# Patient Record
Sex: MALE | Race: ASIAN | NOT HISPANIC OR LATINO | URBAN - METROPOLITAN AREA
[De-identification: names, ages, dates, MRNs, and addresses within clinical notes are randomized per-mention and may not be internally consistent; named-entity substitution may affect disease eponyms.]

---

## 2019-10-04 NOTE — TELEPHONE ENCOUNTER
RECORDS RECEIVED FROM: Rt Hip Pain, No Imaging, No Surgeries, Appt Per Pts Wife   DATE RECEIVED: 10/4   NOTES STATUS DETAILS   OFFICE NOTE from referring provider N/A    OFFICE NOTE from other specialist N/A    DISCHARGE SUMMARY from hospital N/A    DISCHARGE REPORT from the ER N/A    OPERATIVE REPORT N/A    MEDICATION LIST N/A    MRI N/A    CT SCAN N/A    XRAYS (IMAGES & REPORTS) N/A

## 2019-10-10 DIAGNOSIS — M25.551 RIGHT HIP PAIN: Primary | ICD-10-CM

## 2019-10-11 ENCOUNTER — PRE VISIT (OUTPATIENT)
Dept: ORTHOPEDICS | Facility: CLINIC | Age: 79
End: 2019-10-11

## 2019-10-11 ENCOUNTER — ANCILLARY PROCEDURE (OUTPATIENT)
Dept: GENERAL RADIOLOGY | Facility: CLINIC | Age: 79
End: 2019-10-11
Payer: COMMERCIAL

## 2019-10-11 ENCOUNTER — OFFICE VISIT (OUTPATIENT)
Dept: ORTHOPEDICS | Facility: CLINIC | Age: 79
End: 2019-10-11
Payer: COMMERCIAL

## 2019-10-11 VITALS
HEIGHT: 62 IN | DIASTOLIC BLOOD PRESSURE: 78 MMHG | WEIGHT: 146.2 LBS | BODY MASS INDEX: 26.91 KG/M2 | SYSTOLIC BLOOD PRESSURE: 138 MMHG | HEART RATE: 62 BPM

## 2019-10-11 DIAGNOSIS — M25.551 RIGHT HIP PAIN: ICD-10-CM

## 2019-10-11 DIAGNOSIS — M25.551 LATERAL PAIN OF RIGHT HIP: Primary | ICD-10-CM

## 2019-10-11 RX ORDER — AMLODIPINE BESYLATE 10 MG/1
TABLET ORAL
Refills: 2 | COMMUNITY
Start: 2019-08-20

## 2019-10-11 RX ORDER — ATORVASTATIN CALCIUM 10 MG/1
TABLET, FILM COATED ORAL
Refills: 1 | COMMUNITY
Start: 2019-10-03

## 2019-10-11 RX ORDER — HYDROCHLOROTHIAZIDE 12.5 MG/1
TABLET ORAL
Refills: 2 | COMMUNITY
Start: 2019-08-12

## 2019-10-11 RX ORDER — CHOLECALCIFEROL (VITAMIN D3) 50 MCG
1 TABLET ORAL
COMMUNITY
Start: 2013-01-04

## 2019-10-11 RX ORDER — METOPROLOL SUCCINATE 50 MG/1
TABLET, EXTENDED RELEASE ORAL
Refills: 2 | COMMUNITY
Start: 2019-08-20

## 2019-10-11 RX ORDER — LANOLIN ALCOHOL/MO/W.PET/CERES
1000 CREAM (GRAM) TOPICAL
COMMUNITY

## 2019-10-11 ASSESSMENT — MIFFLIN-ST. JEOR: SCORE: 1254.41

## 2019-10-11 NOTE — PROGRESS NOTES
" Subjective:   Huber Villalba is a 79 year old male who presents with right hip/buttock pain.  He states that for the past year he has had some intermittent right lateral and posterior hip discomfort.  He states this oftentimes can occur late in exercise or after laying on his right side.  He has not had any trauma to the right hip.  He does not have any pain with exercise on the elliptical  or while walking on the treadmill.    Background:   Date of injury: None  Duration of symptoms: 1 year  Mechanism of Injury: Chronic; Unknown   Intensity: No pain at rest; 3-4/10 when walking; lying on right side 1-2/10  Aggravating factors:Walking for about 100-200 yards, lying on right side  Relieving Factors: Nothing has been tried  Prior Evaluation: None    PAST MEDICAL, SOCIAL, SURGICAL AND FAMILY HISTORY: He  has no past medical history on file.  He  has no past surgical history on file.  His family history is not on file.  He reports that he has never smoked. He has never used smokeless tobacco.    ALLERGIES: He is allergic to nsaids.    CURRENT MEDICATIONS: He has a current medication list which includes the following prescription(s): amlodipine, atorvastatin, cyanocobalamin, hydrochlorothiazide, metoprolol succinate er, turmeric curcumin, and vitamin d3.     REVIEW OF SYSTEMS: 12 point review of systems is negative except as noted above.     Exam:   /72 (BP Location: Left arm, Patient Position: Sitting, Cuff Size: Adult Regular)   Pulse 73   Ht 1.57 m (5' 1.81\")   Wt 66.3 kg (146 lb 3.2 oz)   BMI 26.90 kg/m        CONSTITUTIONAL: healthy, alert and no distress  HEAD: Normocephalic. No masses, lesions, tenderness or abnormalities  SKIN: no suspicious lesions or rashes  GAIT: normal  PSYCHIATRIC: affect normal/bright and mentation appears normal.    MUSCULOSKELETAL:   RIGHT HIP: comprehensive examination demonstrates FROM, (-) FAdIR, (-) Eloise, (-) log roll. MMT of the hip demonstrates ability to actively " flex, abduct, adduct and extend the hip.  He has minimal discomfort with resisted hip abduction.  He is mildly tender to palpation in the region of the gluteus medius tendon just posterior and superior to its insertion at the greater trochanter.       Assessment/Plan:   Huber is a 79-year-old male with right lateral hip discomfort which is intermittent.  His radiographs demonstrate some mild degenerative changes of the hip.  His exam does not suggest that this is intra-articular.  I suspect there is some mild degenerative changes of his gluteus medius tendon versus a somewhat aberrant bursa.  I discussed consideration for physical therapy for instruction in a progressive gluteus medius strengthening program but he will obtain this from 1 of his trainers at the gym.  He is otherwise reassured.

## 2019-10-11 NOTE — LETTER
"  10/11/2019      RE: Huber Villalba  [address] [address]        Subjective:   Huber Villalba is a 79 year old male who presents with right hip/buttock pain.  He states that for the past year he has had some intermittent right lateral and posterior hip discomfort.  He states this oftentimes can occur late in exercise or after laying on his right side.  He has not had any trauma to the right hip.  He does not have any pain with exercise on the elliptical  or while walking on the treadmill.    Background:   Date of injury: None  Duration of symptoms: 1 year  Mechanism of Injury: Chronic; Unknown   Intensity: No pain at rest; 3-4/10 when walking; lying on right side 1-2/10  Aggravating factors:Walking for about 100-200 yards, lying on right side  Relieving Factors: Nothing has been tried  Prior Evaluation: None    PAST MEDICAL, SOCIAL, SURGICAL AND FAMILY HISTORY: He  has no past medical history on file.  He  has no past surgical history on file.  His family history is not on file.  He reports that he has never smoked. He has never used smokeless tobacco.    ALLERGIES: He is allergic to nsaids.    CURRENT MEDICATIONS: He has a current medication list which includes the following prescription(s): amlodipine, atorvastatin, cyanocobalamin, hydrochlorothiazide, metoprolol succinate er, turmeric curcumin, and vitamin d3.     REVIEW OF SYSTEMS: 12 point review of systems is negative except as noted above.     Exam:   /72 (BP Location: Left arm, Patient Position: Sitting, Cuff Size: Adult Regular)   Pulse 73   Ht 1.57 m (5' 1.81\")   Wt 66.3 kg (146 lb 3.2 oz)   BMI 26.90 kg/m         CONSTITUTIONAL: healthy, alert and no distress  HEAD: Normocephalic. No masses, lesions, tenderness or abnormalities  SKIN: no suspicious lesions or rashes  GAIT: normal  PSYCHIATRIC: affect normal/bright and mentation appears normal.    MUSCULOSKELETAL:   RIGHT HIP: comprehensive examination demonstrates " FROM, (-) FAdIR, (-) Eloise, (-) log roll. MMT of the hip demonstrates ability to actively flex, abduct, adduct and extend the hip.  He has minimal discomfort with resisted hip abduction.  He is mildly tender to palpation in the region of the gluteus medius tendon just posterior and superior to its insertion at the greater trochanter.       Assessment/Plan:   Huber is a 79-year-old male with right lateral hip discomfort which is intermittent.  His radiographs demonstrate some mild degenerative changes of the hip.  His exam does not suggest that this is intra-articular.  I suspect there is some mild degenerative changes of his gluteus medius tendon versus a somewhat aberrant bursa.  I discussed consideration for physical therapy for instruction in a progressive gluteus medius strengthening program but he will obtain this from 1 of his trainers at the gym.  He is otherwise reassured.    Osmin Webster MD

## 2019-10-11 NOTE — LETTER
Date:October 14, 2019      Patient was self referred, no letter generated. Do not send.        [facility] Health Information

## 2019-10-30 ENCOUNTER — HEALTH MAINTENANCE LETTER (OUTPATIENT)
Age: 79
End: 2019-10-30

## 2019-12-15 ENCOUNTER — HEALTH MAINTENANCE LETTER (OUTPATIENT)
Age: 79
End: 2019-12-15

## 2020-09-14 ENCOUNTER — MEDICAL CORRESPONDENCE (OUTPATIENT)
Dept: HEALTH INFORMATION MANAGEMENT | Facility: CLINIC | Age: 80
End: 2020-09-14

## 2021-01-15 ENCOUNTER — HEALTH MAINTENANCE LETTER (OUTPATIENT)
Age: 81
End: 2021-01-15

## 2021-03-10 ENCOUNTER — TRANSFERRED RECORDS (OUTPATIENT)
Dept: HEALTH INFORMATION MANAGEMENT | Facility: CLINIC | Age: 81
End: 2021-03-10

## 2021-03-15 ENCOUNTER — REFERRAL (OUTPATIENT)
Dept: TRANSPLANT | Facility: CLINIC | Age: 81
End: 2021-03-15

## 2021-03-15 DIAGNOSIS — E78.5 HYPERLIPIDEMIA: ICD-10-CM

## 2021-03-15 DIAGNOSIS — N18.9 CHRONIC KIDNEY DISEASE: ICD-10-CM

## 2021-03-15 DIAGNOSIS — N18.5 CHRONIC KIDNEY DISEASE, STAGE 5, KIDNEY FAILURE (H): Primary | ICD-10-CM

## 2021-03-15 DIAGNOSIS — C80.1 CANCER (H): ICD-10-CM

## 2021-03-15 DIAGNOSIS — I10 ESSENTIAL HYPERTENSION: ICD-10-CM

## 2021-03-15 DIAGNOSIS — I10 HYPERTENSION: ICD-10-CM

## 2021-03-15 DIAGNOSIS — Z76.82 ORGAN TRANSPLANT CANDIDATE: ICD-10-CM

## 2021-03-15 DIAGNOSIS — I25.10 CARDIOVASCULAR DISEASE: ICD-10-CM

## 2021-03-15 DIAGNOSIS — N18.9 CHRONIC RENAL FAILURE: ICD-10-CM

## 2021-03-15 DIAGNOSIS — Z01.818 PRE-TRANSPLANT EVALUATION FOR KIDNEY TRANSPLANT: ICD-10-CM

## 2021-03-15 NOTE — LETTER
Huber Villalba  [address] [address]          Dear Huber,    Thank you for your interest in the Transplant Center at [facility]. We look forward to being a part of your care team and assisting you through the transplant process.    As we discussed, your transplant coordinator is Katarzyna Lim (Kidney).  You may call your coordinator at any time with questions or concerns call [phone].    Please complete the following.    1. Fill out and return the enclosed forms    Authorization for Electronic Communication    Authorization to Discuss Protected Health Information    Authorization for Release of Protected Health Information    Authorization for Care Everywhere Release of Information    2. Sign up for:    [employer], access to your electronic medical record (see enclosed pamphlet)    [email or web address], a transplant education website    You can use these tools to learn more about your transplant, communicate with your care team, and track your medical details      Sincerely,  Solid Organ Transplant  [facility]    Cc: Wilber Harrington MD (PCP)

## 2021-03-17 VITALS — HEIGHT: 63 IN | BODY MASS INDEX: 23.92 KG/M2 | WEIGHT: 135 LBS

## 2021-03-17 SDOH — HEALTH STABILITY: MENTAL HEALTH: HOW OFTEN DO YOU HAVE 6 OR MORE DRINKS ON ONE OCCASION?: NEVER

## 2021-03-17 SDOH — HEALTH STABILITY: MENTAL HEALTH: HOW OFTEN DO YOU HAVE A DRINK CONTAINING ALCOHOL?: NEVER

## 2021-03-17 SDOH — HEALTH STABILITY: MENTAL HEALTH: HOW MANY STANDARD DRINKS CONTAINING ALCOHOL DO YOU HAVE ON A TYPICAL DAY?: NOT ASKED

## 2021-03-17 ASSESSMENT — MIFFLIN-ST. JEOR: SCORE: 1217.49

## 2021-03-17 NOTE — TELEPHONE ENCOUNTER
PCP: Dr. Edinson Hui (Secondary)  PCP: Dr. Wilber Harrington  (Primary)  Referring Provider: [facility]   Nephrologist: Dr. Conor Villalba   Referring Diagnosis: CKD Stage 5    Is patient under the age of 65? NO  Is patient diabetic? No  Is patient on insulin? No  Was patient offered a pancreas transplant referral? No    Is patient in a group home/assisted living? No  Does patient have a guardian? No    Referral intake process completed.  Patient is aware that after financial approval is received, medical records will be requested.   Patient confirmed for a callback from transplant coordinator on 3/30/21. (within 2 weeks)  Tentative evaluation date 4/26/21. (within 4 weeks)    Confirmed coordinator will discuss evaluation process in more detail at the time of their call.   Patient is aware of the need to arrange age appropriate cancer screening, vaccinations, and dental care.  Reminded patient to complete questionnaire, complete medical records release, and review packet prior to evaluation visit .  Assessed patient for special needs (ie--wheelchair, assistance, guardian, and ):  None  Patient instructed to call [phone] with questions.     Patient gave verbal consent during intake call to obtain medical records and documents outside of [facility]:  Yes     BALDEMAR Garay, LPN   Solid Organ Transplant

## 2021-03-30 NOTE — TELEPHONE ENCOUNTER
Contacted patient and introduced myself as their Transplant Coordinator, also introduced the role of the Transplant Coordinator in the transplant process.  Explained the purpose of this call including reviewing next steps and answering questions.    Confirmed Referring Provider, Dialysis Center, and Primary Care Physician. Notified patient of the importance of continued communication with referring providers and primary care physicians.    Reviewed components of transplant evaluation process including necessary appointments, tests, and procedures.    Answered questions for patient regarding evaluation, provided my name and contact information and requested they call with any additional questions.    Determined that patient would like additional information regarding transplant by:     Drop Down choices: Mail, Email, MyChart, Phone Call   Encourage MyChart   Notified patients that they will hear from a Transplant  to schedule evaluation.    Reviewed medical records and interviewed the patient. CKD with GFR of 14 on 3/4/2021. No kidney biopsy but patient believes there is a genetic component to his kidney disease. HTN, thalassemia minor trait, kidney stones from decades ago and macular degeneration. 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 His follow up now is annual PSA levels. He has been a regular patient at [facility] with the [facility] but opted to come here due to distance that would be required to travel for post transplant care.  Never received blood, non smoker, social etoh and no recreational drugs. BMI 24.3. Lives with his wife and other family members that would be able to help. He is very independent and active going to health clubs, yoga and golf. His wife may be a potential donor. He is due for dental and colonoscopy. We did talk about his age being a consideration for candidacy due to the approximate 5 year wait list. He and his wife are interested to see  if she would qualify at age 72.     We talked about the evaluation process and he has a std for 4/26 in person. He has the ability to watch the MTP videos online at home prior to the evaluation. Reviewed the list of providers he will see and their roles. He will arrive by 0730 and may eat breakfast and take his morning medications. Reviewed the overall goals of an evaluation and the approval process. He was given my contact information.     Smart set orders routed to scheduling.

## 2021-04-11 ENCOUNTER — MEDICAL CORRESPONDENCE (OUTPATIENT)
Dept: HEALTH INFORMATION MANAGEMENT | Facility: CLINIC | Age: 81
End: 2021-04-11

## 2021-04-16 ENCOUNTER — TELEPHONE (OUTPATIENT)
Dept: TRANSPLANT | Facility: CLINIC | Age: 81
End: 2021-04-16

## 2021-04-16 NOTE — TELEPHONE ENCOUNTER
Email to Mr. Villalba April 16, 2021:    Dear Mr. Villalba,     Thank you so much for taking the time to talk with me today 4/16/2021 before your trip to [facility].    My note below is in Epic MyChart.   Sincerely,  Magaly KNIGHT/ Magaly Mahmood RN BSN casual coordinator reached Mr. Villalba as he was headed out the door to [facility] to see Edinson Hui his physician at [facility] for 25+ yrs. I added his physicians to his snapshot page that he wanted added.   I verified his email can be lower case or upper case I will convert to lower case in snapshot.   He reported signed all the NICOLE s one month ago for the three providers.   Wilber Harrington, [facility] MD  Edinson Hui [facility] MD Conor Villalba MD Nephrologist     I informed Mr. Villalba I would write MyChart note he can access and send email with the MTP teaching educational links to watch prior to the PKE on 4/26/2021.  Mr. Villalba reports to have not viewed the [facility]TransplantPlace (MTP)  episodes.  Mr. Villalba is willing to watch them prior to his Virtual Evaluation on 4/26/2021.     Mr. Villalba aware Carol Lim RN BSN is his assigned nurse coordinator and once he has viewed the episodes in [facility] he may call Carol to inform her and ask any questions of the educational materials.      Email  [email or web address]   Or [email or web address]    Dear Mr. Villalba,  Thank you for taking my telephone call April 16, 2021.     Our team thank you in advance for coming into our clinic: [facility]   We are located on 3C third floor.  Our Certified Medical Assistant (CMA)Prabha or Jane will meet you in clinic.  You may bring one person with you. We ask you kindly to wear mask covering nose and mouth for the entire time in the building. Thank you so much.     The schedule will outline  If you could come to the clinic by 8:00 am that helps with check in with the CMA.   Main lobby Covid questions:   8:00  CMA Clinic 3A.  8:00- 9:15  "am :  BENITA Espinoza and  Nephrologist Yoandy Barahona MD  9:15 to- 9:30 am  Dietitian Vicenta   9:30 -10:30 Surgeon Lucie Ramos MD  10:30 11:30   either Clara or Shena  11-12 Coordinator to call you to wrap up the day.     Prior to April 26,2021, please take time to [place] to our Informational Educational Website     Our transplant team would like you to search internet via Google to register to our Transplant information website MyTransplantPlace online      [email or web address]     Once you on the LogIn page [place] New User     Follow the registration enter name, email, enter a password ( This password does not need to be changed)    Once you are registered it will send an activation link to your email. Please enter confirmation link into browser or Activation code.  Complete the Account page and Save your data.     Once you are completely registered open click on the \" Pre Transplant\" section button.     Listen and watch the Transplant I section of 15 video modules and Transplant II section episodes prior to your Evaluation call date.   Call your coordinator, Carol Lim RN BSN after you watched all  episodes and ask if any questions.     There will be additional  will review.      The week after your phone/virtual visit our team will meet to discuss your case.    Our team will request Labs;  24-29 vials of blood; EKG, Echo and CXR at [facility].  In future other tests might be asked to be done at our facililty or locally.    If you have live donors we ask you to verify with the surgeon on April 26,2021 if your donors can register online at our DONOR Website   [email or web address]   Phone [phone]      Thank you so much for taking my call.  This note is in MyChart and sent to your email address we confirmed: [email or web address].   Please reply to this email that you received Educational email information.     Sincerely, "   Magaly Mahmood RN BSN ( Casual Coordinator/ Colleage of TRAVIS YanN)   Phone [phone]   [facility] Transplant Program          1. Our LPN Magdalena Borjas will send email educational data set and we request you fill out and sign two forms  electronically in [employer].     ( KDPI form and Receipt of information). Please check his email   2. [employer] ( [employer]) is our Transplant educational program.  We want you to register using Convene Search engine Online.  [email or web address] In the Pre Transplant Section, please click on the Kidney I and Kidney II tutorial and watch all the videos. We want you to watch videos prior to your Evaluation. Once you have registered successfully and/or watched the links below; please call transplant office at [phone] to leave message or ask to speak with TRAVIS YanN your coordinator.  IF you are not able to register into the MTP program online,  you may click the links directly below.    3.  The Living Donor Information hyperlink in blue. You may watch and discuss live kidney donation with Dr. Ramos.   Please ask the MD/surgeon if donors can start their process when you meet on April 26, 2021.     Please access The [facility]  modules by following the below links:  SOT Pre-Transplant Training  Kidney I [email or web address]   SOT Pre-Transplant Training  Kidney II [email or web address]                Living Donor Information [email or web address]      Our team will discuss your case at our virtual committee one week after your evaluation on May 5,2021 and TRAVIS YanN will call you with the team plan.       Each person will receive email and be asked to sign document that you read and received our educational materials.  Your nurse coordinator must  document in your electronic file that you received and watched the educational materials. (CMS/OPTN/UNOS [employer]aring mandatory require documentation be obtained as we are audited every three years.)    Sincerely Magaly on behalf of Carol Lim RN BSN     (I am copying May Potter our  and Carol Lim RN BSN)

## 2021-04-22 ENCOUNTER — TELEPHONE (OUTPATIENT)
Dept: TRANSPLANT | Facility: CLINIC | Age: 81
End: 2021-04-22

## 2021-04-22 NOTE — TELEPHONE ENCOUNTER
Spoke with patient. Confirmed upcoming PKE appointments at [facility] on 4/26/21 starting at 0730. Patient instructed he may eat breakfast, take regularly scheduled medications and have 1 adult visitor accompany him. Patient states no Covid-19 symptoms and/or exposure within 14 days. Patient states he has contact information for any further questions/concerns/need to reschedule.

## 2021-04-23 NOTE — PROGRESS NOTES
[facility] Solid Organ Transplant  Outpatient MNT: Kidney Transplant Evaluation    Current BMI: 25.1 (HT 62 in,  lbs/63 kg)  BMI is within recommendation of <35 for kidney transplant    Frailty Assessment -- Not Frail (1/5 points for reduced )      Time Spent: 15 minutes  Visit Type: Initial   Referring Physician: Rachel   Pt accompanied by: self     History of previous txp: none   Dialysis: no     Nutrition Assessment  Low K diet. Vegetarian diet x 6 years for lower protein.     Appetite: good    Vitamins, Supplements, Pertinent Meds: vit D, vit B12   Herbal Medicines/Supplements: turmeric     Edema: mild d/t BP med     Weight hx: no changes    Food Security: any concerns about having enough money to buy food or access to grocery stores? No     Diet Recall  Breakfast Egg white omelet with veggies + 1 WW toast with butter    Lunch 1.5-2 patties of veggie burger + salad with cucumber, carrots with small amount of dressing    Dinner Veggie quesadilla with beans and cheese; some lentils with rice    Snacks Rare    Beverages Coffee (black), water   Alcohol None    Dining out 1-2 meals/week      Physical Activity  5x/week total active days  2x/week 60 min yoga  1x/week 60 min pilates   2x/week stationary bike, rowing machine, weights- 60 minutes  Golf in summer 18 holes 1x/week       Labs  3/26 K 4.7 (on new med to bind potassium), prev 2 high values  3/4 Phos 4    Nutrition Diagnosis  No nutrition diagnosis identified at this time     Nutrition Intervention  Nutrition education provided:  Discussed sodium intake (low sodium foods and drinks, seasoning food without salt and tips for low sodium diet).  Reviewed wnl K/Phos levels, which do not warrant further dietary modification at this time.     Reviewed post txp diet guidelines in brief (will review in further detail post txp):  (1) Review of proper food safety measures d/t immunosuppressant therapy post-op and increased risk for food-borne  illness    (2) Avoid the following post txp d/t risk for rejection, unknown effects on the organs, and/or potential interactions with immunosuppressants:  - Herbal, Chinese, holistic, chiropractic, natural, alternative medicines and supplements  - Detoxes and cleanses  - Weight loss pills  - Protein powders or other products with extracts or herbs (ie green tea extract)    (3) Med regimen and possible side effects    Patient Understanding: Pt verbalized understanding of education provided.  Expected Engagement: Good  Follow-Up Plans: PRN     Nutrition Goals  No nutrition goals identified at this time     Vicenta Michelle, RD, LD, CCTD

## 2021-04-26 ENCOUNTER — OFFICE VISIT (OUTPATIENT)
Dept: TRANSPLANT | Facility: CLINIC | Age: 81
End: 2021-04-26
Attending: PHYSICIAN ASSISTANT
Payer: COMMERCIAL

## 2021-04-26 ENCOUNTER — DOCUMENTATION ONLY (OUTPATIENT)
Dept: TRANSPLANT | Facility: CLINIC | Age: 81
End: 2021-04-26

## 2021-04-26 ENCOUNTER — ANCILLARY PROCEDURE (OUTPATIENT)
Dept: CARDIOLOGY | Facility: CLINIC | Age: 81
End: 2021-04-26
Attending: PHYSICIAN ASSISTANT
Payer: COMMERCIAL

## 2021-04-26 ENCOUNTER — ANCILLARY PROCEDURE (OUTPATIENT)
Dept: GENERAL RADIOLOGY | Facility: CLINIC | Age: 81
End: 2021-04-26
Attending: PHYSICIAN ASSISTANT
Payer: COMMERCIAL

## 2021-04-26 VITALS
BODY MASS INDEX: 25.34 KG/M2 | SYSTOLIC BLOOD PRESSURE: 107 MMHG | DIASTOLIC BLOOD PRESSURE: 73 MMHG | OXYGEN SATURATION: 100 % | HEART RATE: 69 BPM | WEIGHT: 137.7 LBS | HEIGHT: 62 IN

## 2021-04-26 DIAGNOSIS — N18.5 CHRONIC KIDNEY DISEASE, STAGE 5, KIDNEY FAILURE (H): ICD-10-CM

## 2021-04-26 DIAGNOSIS — I25.10 CARDIOVASCULAR DISEASE: ICD-10-CM

## 2021-04-26 DIAGNOSIS — Z01.818 PRE-TRANSPLANT EVALUATION FOR KIDNEY TRANSPLANT: Primary | ICD-10-CM

## 2021-04-26 DIAGNOSIS — I10 ESSENTIAL HYPERTENSION: ICD-10-CM

## 2021-04-26 DIAGNOSIS — E78.5 HYPERLIPIDEMIA: ICD-10-CM

## 2021-04-26 DIAGNOSIS — Z76.82 ORGAN TRANSPLANT CANDIDATE: ICD-10-CM

## 2021-04-26 DIAGNOSIS — Z01.818 PRE-TRANSPLANT EVALUATION FOR KIDNEY TRANSPLANT: ICD-10-CM

## 2021-04-26 DIAGNOSIS — N18.9 CHRONIC RENAL FAILURE: ICD-10-CM

## 2021-04-26 DIAGNOSIS — C80.1 CANCER (H): ICD-10-CM

## 2021-04-26 DIAGNOSIS — Z11.59 ENCOUNTER FOR SCREENING FOR OTHER VIRAL DISEASES: ICD-10-CM

## 2021-04-26 DIAGNOSIS — D56.1 BETA THALASSEMIA (H): ICD-10-CM

## 2021-04-26 DIAGNOSIS — C61 PROSTATE CANCER (H): ICD-10-CM

## 2021-04-26 DIAGNOSIS — Z76.82 ORGAN TRANSPLANT CANDIDATE: Primary | ICD-10-CM

## 2021-04-26 LAB
ABO + RH BLD: NORMAL
ABO + RH BLD: NORMAL
ALBUMIN SERPL-MCNC: 3.9 G/DL (ref 3.4–5)
ALBUMIN UR-MCNC: 100 MG/DL
ALP SERPL-CCNC: 70 U/L (ref 40–150)
ALT SERPL W P-5'-P-CCNC: 28 U/L (ref 0–70)
ANION GAP SERPL CALCULATED.3IONS-SCNC: 8 MMOL/L (ref 3–14)
APPEARANCE UR: CLEAR
APTT PPP: 29 SEC (ref 22–37)
AST SERPL W P-5'-P-CCNC: 18 U/L (ref 0–45)
BASOPHILS # BLD AUTO: 0 10E9/L (ref 0–0.2)
BASOPHILS NFR BLD AUTO: 0.2 %
BILIRUB SERPL-MCNC: 0.4 MG/DL (ref 0.2–1.3)
BILIRUB UR QL STRIP: NEGATIVE
BLD GP AB SCN SERPL QL: NORMAL
BLOOD BANK CMNT PATIENT-IMP: NORMAL
BUN SERPL-MCNC: 56 MG/DL (ref 7–30)
CALCIUM SERPL-MCNC: 8.9 MG/DL (ref 8.5–10.1)
CHLORIDE SERPL-SCNC: 107 MMOL/L (ref 94–109)
CO2 SERPL-SCNC: 24 MMOL/L (ref 20–32)
COLOR UR AUTO: YELLOW
CREAT SERPL-MCNC: 3.44 MG/DL (ref 0.66–1.25)
DIFFERENTIAL METHOD BLD: ABNORMAL
EOSINOPHIL # BLD AUTO: 0.1 10E9/L (ref 0–0.7)
EOSINOPHIL NFR BLD AUTO: 0.8 %
ERYTHROCYTE [DISTWIDTH] IN BLOOD BY AUTOMATED COUNT: 17.3 % (ref 10–15)
GFR SERPL CREATININE-BSD FRML MDRD: 16 ML/MIN/{1.73_M2}
GLUCOSE SERPL-MCNC: 108 MG/DL (ref 70–99)
GLUCOSE UR STRIP-MCNC: 50 MG/DL
HBV CORE AB SERPL QL IA: NONREACTIVE
HBV SURFACE AB SERPL IA-ACNC: 0.09 M[IU]/ML
HBV SURFACE AG SERPL QL IA: NONREACTIVE
HCT VFR BLD AUTO: 30.1 % (ref 40–53)
HCV AB SERPL QL IA: NONREACTIVE
HGB BLD-MCNC: 9 G/DL (ref 13.3–17.7)
HGB UR QL STRIP: NEGATIVE
HIV 1+2 AB+HIV1 P24 AG SERPL QL IA: NONREACTIVE
IMM GRANULOCYTES # BLD: 0 10E9/L (ref 0–0.4)
IMM GRANULOCYTES NFR BLD: 0.5 %
INR PPP: 0.93 (ref 0.86–1.14)
KETONES UR STRIP-MCNC: NEGATIVE MG/DL
LEUKOCYTE ESTERASE UR QL STRIP: NEGATIVE
LYMPHOCYTES # BLD AUTO: 1.3 10E9/L (ref 0.8–5.3)
LYMPHOCYTES NFR BLD AUTO: 19.4 %
MCH RBC QN AUTO: 20 PG (ref 26.5–33)
MCHC RBC AUTO-ENTMCNC: 29.9 G/DL (ref 31.5–36.5)
MCV RBC AUTO: 67 FL (ref 78–100)
MONOCYTES # BLD AUTO: 0.6 10E9/L (ref 0–1.3)
MONOCYTES NFR BLD AUTO: 9.4 %
MUCOUS THREADS #/AREA URNS LPF: PRESENT /LPF
NEUTROPHILS # BLD AUTO: 4.6 10E9/L (ref 1.6–8.3)
NEUTROPHILS NFR BLD AUTO: 69.7 %
NITRATE UR QL: NEGATIVE
NRBC # BLD AUTO: 0 10*3/UL
NRBC BLD AUTO-RTO: 0 /100
PH UR STRIP: 5 PH (ref 5–7)
PLATELET # BLD AUTO: 230 10E9/L (ref 150–450)
POTASSIUM SERPL-SCNC: 4.4 MMOL/L (ref 3.4–5.3)
PROT SERPL-MCNC: 8.2 G/DL (ref 6.8–8.8)
RBC # BLD AUTO: 4.5 10E12/L (ref 4.4–5.9)
RBC #/AREA URNS AUTO: <1 /HPF (ref 0–2)
SODIUM SERPL-SCNC: 139 MMOL/L (ref 133–144)
SOURCE: ABNORMAL
SP GR UR STRIP: 1.01 (ref 1–1.03)
SPECIMEN EXP DATE BLD: NORMAL
T PALLIDUM AB SER QL: NONREACTIVE
UROBILINOGEN UR STRIP-MCNC: 0 MG/DL (ref 0–2)
WBC # BLD AUTO: 6.5 10E9/L (ref 4–11)
WBC #/AREA URNS AUTO: 0 /HPF (ref 0–5)

## 2021-04-26 PROCEDURE — 93306 TTE W/DOPPLER COMPLETE: CPT | Performed by: INTERNAL MEDICINE

## 2021-04-26 PROCEDURE — 86706 HEP B SURFACE ANTIBODY: CPT | Mod: 90 | Performed by: PATHOLOGY

## 2021-04-26 PROCEDURE — 86780 TREPONEMA PALLIDUM: CPT | Mod: 90 | Performed by: PATHOLOGY

## 2021-04-26 PROCEDURE — 71046 X-RAY EXAM CHEST 2 VIEWS: CPT | Mod: GC | Performed by: RADIOLOGY

## 2021-04-26 PROCEDURE — 85610 PROTHROMBIN TIME: CPT | Performed by: PATHOLOGY

## 2021-04-26 PROCEDURE — 85390 FIBRINOLYSINS SCREEN I&R: CPT | Performed by: PATHOLOGY

## 2021-04-26 PROCEDURE — 99205 OFFICE O/P NEW HI 60 MIN: CPT | Performed by: SURGERY

## 2021-04-26 PROCEDURE — 86850 RBC ANTIBODY SCREEN: CPT | Mod: 90 | Performed by: PATHOLOGY

## 2021-04-26 PROCEDURE — 99000 SPECIMEN HANDLING OFFICE-LAB: CPT | Performed by: PATHOLOGY

## 2021-04-26 PROCEDURE — 86803 HEPATITIS C AB TEST: CPT | Mod: 90 | Performed by: PATHOLOGY

## 2021-04-26 PROCEDURE — 81240 F2 GENE: CPT | Mod: 90 | Performed by: PATHOLOGY

## 2021-04-26 PROCEDURE — 36415 COLL VENOUS BLD VENIPUNCTURE: CPT | Performed by: PATHOLOGY

## 2021-04-26 PROCEDURE — 80053 COMPREHEN METABOLIC PANEL: CPT | Performed by: PATHOLOGY

## 2021-04-26 PROCEDURE — 87340 HEPATITIS B SURFACE AG IA: CPT | Mod: 90 | Performed by: PATHOLOGY

## 2021-04-26 PROCEDURE — 85613 RUSSELL VIPER VENOM DILUTED: CPT | Mod: 90 | Performed by: PATHOLOGY

## 2021-04-26 PROCEDURE — 81241 F5 GENE: CPT | Performed by: PATHOLOGY

## 2021-04-26 PROCEDURE — 81001 URINALYSIS AUTO W/SCOPE: CPT | Performed by: PATHOLOGY

## 2021-04-26 PROCEDURE — 86665 EPSTEIN-BARR CAPSID VCA: CPT | Mod: 90 | Performed by: PATHOLOGY

## 2021-04-26 PROCEDURE — 86905 BLOOD TYPING RBC ANTIGENS: CPT | Mod: 90 | Performed by: PATHOLOGY

## 2021-04-26 PROCEDURE — 86147 CARDIOLIPIN ANTIBODY EA IG: CPT | Mod: 90 | Performed by: PATHOLOGY

## 2021-04-26 PROCEDURE — 86900 BLOOD TYPING SEROLOGIC ABO: CPT | Mod: 90 | Performed by: PATHOLOGY

## 2021-04-26 PROCEDURE — 85730 THROMBOPLASTIN TIME PARTIAL: CPT | Mod: 90 | Performed by: PATHOLOGY

## 2021-04-26 PROCEDURE — 99205 OFFICE O/P NEW HI 60 MIN: CPT

## 2021-04-26 PROCEDURE — 85670 THROMBIN TIME PLASMA: CPT | Mod: 90 | Performed by: PATHOLOGY

## 2021-04-26 PROCEDURE — 86644 CMV ANTIBODY: CPT | Mod: 90 | Performed by: PATHOLOGY

## 2021-04-26 PROCEDURE — 86886 COOMBS TEST INDIRECT TITER: CPT | Mod: 90 | Performed by: PATHOLOGY

## 2021-04-26 PROCEDURE — 85025 COMPLETE CBC W/AUTO DIFF WBC: CPT | Performed by: PATHOLOGY

## 2021-04-26 PROCEDURE — 86481 TB AG RESPONSE T-CELL SUSP: CPT | Mod: 90 | Performed by: PATHOLOGY

## 2021-04-26 PROCEDURE — 86787 VARICELLA-ZOSTER ANTIBODY: CPT | Mod: 90 | Performed by: PATHOLOGY

## 2021-04-26 PROCEDURE — 86704 HEP B CORE ANTIBODY TOTAL: CPT | Mod: 90 | Performed by: PATHOLOGY

## 2021-04-26 PROCEDURE — G0452 MOLECULAR PATHOLOGY INTERPR: HCPCS | Performed by: PATHOLOGY

## 2021-04-26 PROCEDURE — 85730 THROMBOPLASTIN TIME PARTIAL: CPT | Performed by: PATHOLOGY

## 2021-04-26 PROCEDURE — 86901 BLOOD TYPING SEROLOGIC RH(D): CPT | Mod: 90 | Performed by: PATHOLOGY

## 2021-04-26 ASSESSMENT — MIFFLIN-ST. JEOR: SCORE: 1213.85

## 2021-04-26 NOTE — LETTER
4/26/2021         RE: Huber Villalba  [address] [facility] [id number]        Dear Colleague,    Thank you for referring your patient, Huber Villalba, to the [facility]. Please see a copy of my visit note below.    Transplant Surgery Consult Note     Medical record number: [id number]  YOB: 1940,   Consult requested by Dr Conor Villalba for evaluation of kidney transplant candidacy.    79 yo (recorded 80 year old) male with CKD stage 5 unknown etio  GFR 14, still active  Exercises regularly  Prostate CA 2005 S/P open prostatectomy 2005  Good surgical risk  Has had a negative cath a few years ago and a negative stress test in Sept 2020  Has a wife and 2 children in their 40s who are potential live donors  Would like to set up a virtual visit with me to meet him and family together to talk about live donation  Looking at this overall status I think live donor is the only option    Risks of the surgical procedure including but not limited to the rare risk of mortality discussed in detail. Patient verbalized good understanding and had several pertinent questions which were answered satisfactorily.     Immunosuppressive regimen, management and long term risks discussed in detail.     Assessment and Recommendations:Mr. Villalba appears to be a good candidate for kidney transplantation and has a good understanding of the risks and benefits of this approach to the management of renal failure. The following issues should be addressed prior to finalizing his transplant candidacy:     Transplant order: Mr. Villalba has Chronic renal failure due to unknown etiology whose condition is not expected to resolve, is expected to progress, and is expected to continue to develop related comorbid conditions.  Recommend he be considered as a candidate for kidney.  Cardiology consult for cardiac risk stratification to be ordered: Yes  CT abdomen and pelvis without contrast to be ordered for  assessment of vascular targets: Yes  Transplant listing labs ordered to include HLA, ABOx2, Cr, etc.  Dietician consult ordered: Yes  Social work consult ordered: Yes  Imaging reports reviewed:  yes  Radiology images reviewed:no  Recipient suitable to move forward with work up of living donors:  Yes      The majority of our visit was spent in counselling, discussing the medical and surgical risks of kidney transplantation. We discussed approximate wait time and how that is influenced by issues such as blood type and sensitization (PRA) and access to a living donor. I contrasted potential waiting time for living vs  donor kidneys from  normal (0-85%) or higher (%) kidney donor profile index (KDPI) donors and their associated outcomes. I would not recommend this individual to consider kidneys from high KDPI donors. The reason for this decision is best summarized as: multiple potential living donors. Potential surgical complications of kidney transplantation include bleeding, superficial or deep wound complications (infection, hernia, lymphocele), ureteral anastomotic failure (leak or stenosis), graft thrombosis, need for reoperation and other issues such as cardiac complications, pneumonia, deep venous thrombosis, pulmonary embolism, post transplant diabetes and death. The potential for recurrent disease or need for retransplantation was also addressed. We discussed the possible need for ureteral stent (and subsequent removal), and the utility of protocol biopsy and laboratory studies to evaluate for rejection or recurrent disease. We discussed the risk of graft rejection, our center's average graft and patient survival rates, immunosuppression protocols, as well as the potential opportunity to participate in clinical trials.  We also discussed the average length of stay, recovery process, and posttransplant lab and monitoring protocol.  I emphasized the need for strict immunosuppression medication  adherence and the potential for complications of immunosuppression such as skin cancer or lymphoma, as well as a very low but not zero risk of donor-derived disease transmission risks (infection, cancer). Mr. Villalba asked good questions and his candidacy will be reviewed at our Multidisciplinary Selection Committee. Thank you for the opportunity to participate in Mr. Villalba's care.      Total time: 60 minutes  Counselling time: 40 minutes    .    ---------------------------------------------------------------------------------------------------    HPI: Mr. Villalba has Chronic renal failure due to unknown etiology. The patient is non-diabetic.       The patient is not on dialysis.    Has potential kidney donors:  Yes .  Interested in participation in paired exchange if a donor is willing: Yes     The patient has the following pertinent history:       No    Yes  Dialysis:    []      [] via:       Blood Transfusion                  []      []  Number of units:   Most recently:  Pregnancy:    []      [] Number:       Previous Transplant:  []      [] Details:    Cancer    []      [] Comment:   Kidney stones   []      [] Comment:      Recurrent infections  []      []  Type:                  Bladder dysfunction  []      [] Cause:    Claudication   []      [] Distance:    Previous Amputation  []      [] Cause:     Chronic anticoagulation  []      [] Indication:   Shinto  []      []      Past Medical History:   Diagnosis Date     Beta thalassemia minor      Cancer (H)     Prostate Cancer      Chronic kidney disease      Hypertension      Kidney stone      Mixed hyperlipidemia      Past Surgical History:   Procedure Laterality Date     ARTHROPLASTY SHOULDER       AS TOTAL KNEE ARTHROPLASTY Left      LAPAROSCOPIC PROSTATECTOMY  03/2005     LITHOTRIPSY       RELEASE CARPAL TUNNEL       No family history on file.  Social History     Socioeconomic History     Marital status:      Spouse name: Not on file     Number  of children: Not on file     Years of education: Not on file     Highest education level: Not on file   Occupational History     Not on file   Social Needs     Financial resource strain: Not on file     Food insecurity     Worry: Not on file     Inability: Not on file     Transportation needs     Medical: Not on file     Non-medical: Not on file   Tobacco Use     Smoking status: Never Smoker     Smokeless tobacco: Never Used   Substance and Sexual Activity     Alcohol use: Not Currently     Frequency: Never     Binge frequency: Never     Drug use: Never     Sexual activity: Not on file   Lifestyle     Physical activity     Days per week: Not on file     Minutes per session: Not on file     Stress: Not on file   Relationships     Social connections     Talks on phone: Not on file     Gets together: Not on file     Attends Mandaen service: Not on file     Active member of club or organization: Not on file     Attends meetings of clubs or organizations: Not on file     Relationship status: Not on file     Intimate partner violence     Fear of current or ex partner: Not on file     Emotionally abused: Not on file     Physically abused: Not on file     Forced sexual activity: Not on file   Other Topics Concern     Parent/sibling w/ CABG, MI or angioplasty before 65F 55M? Not Asked   Social History Narrative     Not on file       ROS:   CONSTITUTIONAL:  No fevers or chills  EYES: negative for icterus  ENT:  negative for hearing loss, tinnitus and sore throat  RESPIRATORY:  negative for cough, sputum, dyspnea  CARDIOVASCULAR:  negative for chest pain Fatigue  None  GASTROINTESTINAL:  negative for nausea, vomiting, diarrhea or constipation  GENITOURINARY:  negative for incontinence, dysuria, bladder emptying problems  HEME:  No easy bruising  INTEGUMENT:  negative for rash and pruritus  NEURO:  Negative for headache, seizure disorder  Allergies:   Allergies   Allergen Reactions     Nsaids Other (See Comments) and Unknown      Medications:  Prescription Medications as of 4/26/2021       Rx Number Disp Refills Start End Last Dispensed Date Next Fill Date Owning Pharmacy    amLODIPine (NORVASC) 10 MG tablet   2 8/20/2019        Class: Historical    atorvastatin (LIPITOR) 10 MG tablet   1 10/3/2019        Class: Historical    cyanocobalamin (VITAMIN B-12) 1000 MCG tablet            Sig: Take 1,000 mcg by mouth    Class: Historical    Route: Oral    hydrochlorothiazide (HYDRODIURIL) 12.5 MG tablet   2 8/12/2019        Class: Historical    metoprolol succinate ER (TOPROL-XL) 50 MG 24 hr tablet   2 8/20/2019        Class: Historical    Turmeric Curcumin 500 MG CAPS            Sig: Take 1 tablet by mouth    Class: Historical    Route: Oral    vitamin D3 (CHOLECALCIFEROL) 2000 units (50 mcg) tablet    1/4/2013        Sig: Take 1 tablet by mouth    Class: Historical    Route: Oral        Exam:     There were no vitals taken for this visit.  Appearance: in no apparent distress.   Skin: normal  Eyes:  no redness or discharge.  Sclera anicteric  Head and Neck: Normal, no rashes or jaundice  Respiratory: easy respirations, no audible wheezing.  Abdomen: rounded, Surgical scars consistent with history     Neuro: without deficit   Psychiatric: Normal mood and affect    Diagnostics:   Recent Results (from the past 672 hour(s))   EKG 12-lead, tracing only [EKG1]    Collection Time: 04/26/21 12:13 PM   Result Value Ref Range    Interpretation ECG Click View Image link to view waveform and result    UA with Microscopic reflex to Culture    Collection Time: 04/26/21 12:53 PM    Specimen: Midstream Urine   Result Value Ref Range    Color Urine Yellow     Appearance Urine Clear     Glucose Urine 50 (A) NEG^Negative mg/dL    Bilirubin Urine Negative NEG^Negative    Ketones Urine Negative NEG^Negative mg/dL    Specific Gravity Urine 1.011 1.003 - 1.035    Blood Urine Negative NEG^Negative    pH Urine 5.0 5.0 - 7.0 pH    Protein Albumin Urine 100 (A)  NEG^Negative mg/dL    Urobilinogen mg/dL 0.0 0.0 - 2.0 mg/dL    Nitrite Urine Negative NEG^Negative    Leukocyte Esterase Urine Negative NEG^Negative    Source Midstream Urine     WBC Urine 0 0 - 5 /HPF    RBC Urine <1 0 - 2 /HPF    Mucous Urine Present (A) NEG^Negative /LPF   Partial thromboplastin time [LAB56]    Collection Time: 04/26/21  1:02 PM   Result Value Ref Range    PTT 29 22 - 37 sec   INR [GOV7688]    Collection Time: 04/26/21  1:02 PM   Result Value Ref Range    INR 0.93 0.86 - 1.14   Comprehensive metabolic panel [LAB17]    Collection Time: 04/26/21  1:02 PM   Result Value Ref Range    Sodium 139 133 - 144 mmol/L    Potassium 4.4 3.4 - 5.3 mmol/L    Chloride 107 94 - 109 mmol/L    Carbon Dioxide 24 20 - 32 mmol/L    Anion Gap 8 3 - 14 mmol/L    Glucose 108 (H) 70 - 99 mg/dL    Urea Nitrogen 56 (H) 7 - 30 mg/dL    Creatinine 3.44 (H) 0.66 - 1.25 mg/dL    GFR Estimate 16 (L) >60 mL/min/[1.73_m2]    GFR Estimate If Black 18 (L) >60 mL/min/[1.73_m2]    Calcium 8.9 8.5 - 10.1 mg/dL    Bilirubin Total 0.4 0.2 - 1.3 mg/dL    Albumin 3.9 3.4 - 5.0 g/dL    Protein Total 8.2 6.8 - 8.8 g/dL    Alkaline Phosphatase 70 40 - 150 U/L    ALT 28 0 - 70 U/L    AST 18 0 - 45 U/L   CBC with platelets differential [MGQ986]    Collection Time: 04/26/21  1:02 PM   Result Value Ref Range    WBC 6.5 4.0 - 11.0 10e9/L    RBC Count 4.50 4.4 - 5.9 10e12/L    Hemoglobin 9.0 (L) 13.3 - 17.7 g/dL    Hematocrit 30.1 (L) 40.0 - 53.0 %    MCV 67 (L) 78 - 100 fl    MCH 20.0 (L) 26.5 - 33.0 pg    MCHC 29.9 (L) 31.5 - 36.5 g/dL    RDW 17.3 (H) 10.0 - 15.0 %    Platelet Count 230 150 - 450 10e9/L    Diff Method Automated Method     % Neutrophils 69.7 %    % Lymphocytes 19.4 %    % Monocytes 9.4 %    % Eosinophils 0.8 %    % Basophils 0.2 %    % Immature Granulocytes 0.5 %    Nucleated RBCs 0 0 /100    Absolute Neutrophil 4.6 1.6 - 8.3 10e9/L    Absolute Lymphocytes 1.3 0.8 - 5.3 10e9/L    Absolute Monocytes 0.6 0.0 - 1.3 10e9/L     Absolute Eosinophils 0.1 0.0 - 0.7 10e9/L    Absolute Basophils 0.0 0.0 - 0.2 10e9/L    Abs Immature Granulocytes 0.0 0 - 0.4 10e9/L    Absolute Nucleated RBC 0.0    ABO/Rh type and screen [QUE397]    Collection Time: 04/26/21  1:02 PM   Result Value Ref Range    ABO PENDING     Antibody Screen PENDING     Test Valid Only At          [facility]    Specimen Expires 04/29/2021      No results found for: CPRA      Again, thank you for allowing me to participate in the care of your patient.        Sincerely,        Lucie Ramos MD

## 2021-04-26 NOTE — PROGRESS NOTES
Psychosocial Assessment For Kidney Transplantation  Patient Name/ Age: Huber Villalba 80 year old   Medical Record #: [id number]  Duration of Interview: 30 min  Process:   Face-to-Face Interview                (counseling < 50%)   Present at Appointment: Huber        : LIOR Han LICSW Date:  2021        Type of transplant: Kidney     Donor type:      Cadaver and spouse   Prior Transplants:    No Status of Transplant: N/A           Current Living Situation    Location:   [address] [place] [id number]  With Whom: lives with their family (wife Dalton, daughter Radha, and 16 mo old grandson)       Family/ Social Support:    Huber reported he has two children, Radha (who lives with him) and his son Rupesh, who lives in [place], [place]). Huber reported his brothers and parents are .    available, helpful   Committed Relationship: Huber is  to Dalton     Stable/Supportive   Other Supports:  Friends, extended family    available, helpful       Activities/ Functional Ability    Current Level: ambulatory, visually impaired (glasses), hearing impaired and independent with ADL's     Transportation drives self       Vocational/Employment/Financial     Employment   retired   Job Description   Huber reported he is a retired . His wife is retired as well.      Income   SS alf and pension    Insurance      At this time, patient can afford medication costs:  Yes  Medicare BCBS Medicare Adv with Part D    Explained Huber will pay a 20% copay until his BC Med Adv plan deductible is met. Huber denied any concerns with the potential cost of Valcyte.        Medical Status    Current Mode of Treatment for ESRD None   Complications None       Behavioral    Tobacco Use No Chemical Dependency No   Huber denied any tobacco use.  Huber denied any current or history of alcohol, substance use, or chemical dependency treatment.      Psychiatric  Impairment No  Huber denied any current or history of anxiety, depression, or other mental health concerns.     Reading Ability: Good  Education Level: Masters Degree Recent Legal History No      Coping Style/Strategies: Exercising (yoga, piliates, biking, golf)       Ability to Adhere to Complex Medical Regime: Yes     Adherence History: Huber reported he follows his physician's recommendations, takes his medications as prescribed, and attends his appointments as scheduled.         Education  _X_ Medicare  _X_ Rehabilitation  _X_ Donor issues  _X_ Community resources  _X_ Post discharge housing  _X_ Financial resources  _X_ Medical insurance options  _X_ Psych adjustment  _X_ Family adjustment  _X_ Health Care Directive Provided Education   Psychosocial Risks of Transplant Reviewed and Discussed:  _X_ Increased stress related to emotional,            family, social, employment or financial           situation  _X_ Effect on work and/or disability benefits  _X_ Effect on future health and life           insurance  _X_ Transplant outcome expectations may           not be met  _X_ Mental Health Risks: anxiety,           depression, PTSD, guilt, grief and           chronic fatigue     Notable Items:   None noted.       Final Evaluation/Assessment   Patient seemed to process information well. Appeared well informed, motivated and able to follow post transplant requirements. Behavior was appropriate during interview. Has adequate income and insurance coverage. Adequate social support. No major contraindications noted for transplant.  At this time patient appears to understand the risks and benefits of transplant.      Recommendation  Acceptable    Selection Criteria Met:  Plan for support Yes   Chemical Dependence Yes   Smoking Yes   Mental Health Yes   Adequate Finances Yes    Signature: [facility]   Title: Clinical

## 2021-04-26 NOTE — PROGRESS NOTES
TRANSPLANT NEPHROLOGY RECIPIENT EVALUATION NOTE    Assessment and Plan:  # Kidney Transplant Evaluation: Patient is a good candidate overall. Benefits of a living donor transplant were discussed. Due to age, LDKT is likely to be the best option.     # CKD Stage 5 from Unknown Etiology: not yet on dialysis, eGFR 14 ml/min, and overall feeling OK, but may benefit from a kidney transplant.    # Cardiac Risk: he has known non-obstructive CAD seen on 2016 coronary angiogram (no PCI) and normal exercise stress ECHO September 2020. He is asymptomatic with exertion.    # Functional Status: appears intact as he is quite physically active for his age (biking, yoga, pilates).    # Prostate Cancer: [place] 3+3 s/p prostatectomy in 2005 with undetectable PSA levels.    # Beta Thalassemia: stable hemoglobin.     # Health Maintenance: Colonoscopy: due (last completed 2015 with recommendations to repeat 3 years) and Dental: Up to date    Discussed the risks and benefits of a transplant, including the risk of surgery and immunosuppression medications.  Patient's overall evaluation will be discussed in the Transplant Program's regular meeting with a final recommendation on the patients suitability for transplant to be made at that time.    Pending completion of the full evaluation, patient presently appears to be enough of an acceptable kidney transplant recipient candidate to have any potential kidney donors start the evaluation process.  Patient was seen in conjunction with Dr. Yoandy Clay as part of a shared visit.    PHYSICIAN ATTESTATION AND EVALUATION  Patient was seen by myself, Dr. Yoandy Clay, in conjunction with Olga Dickerson PA-C as part of a shared visit.    I personally reviewed the patient's past medical and surgical history, vital signs, medications and pertinent laboratory results and radiology findings.  Present and past medical history, along with significant physical exam findings were all reviewed with  IOANA.    Gates findings:  Huber Villalba is a 80-year-old male (patient states that his biological age is 78), retired executive, strong family history of chronic kidney disease of unclear cause with 2 brothers on dialysis, history of coronary artery disease with left heart catheterization 10/2016 showing 30% stenosis of the left main, left circumflex, RCA, CKD 5 with most recent GFR of 14 mL/min on 3/10/2021, history of prostate adenocarcinoma [place] 3+3 in 2005 status post prostatectomy with negative PSAs, last colonoscopy in 2015 with plan to repeat in 2 years which he has not done presenting for evaluation for kidney transplantation.    The patient appears to be a good candidate for kidney transplantation but due to his age he will require a living donor kidney transplant.      Key management decisions made by me and discussed with IOANA include the following, with full Assessment and Plan noted above by IOANA:  #Kidney transplant evaluation:  -Patient is a good candidate overall.  Benefits of a living donor kidney transplant were explained.  -He is only a living kidney donor transplant candidate due to his age  -He will need a CT of his abdomen and pelvis with ultrasound of the iliacs  -He is up-to-date with cardiac testing  -He will need a repeat colonoscopy    #Chronic kidney disease stage V from unclear cause:  -Strong family history of kidney failure with 2 of his brothers on dialysis    #Cardiac risk:  -Low to moderate.  Patient does have a history of coronary artery disease based on his heart catheterization 10/2016 that showed 30% stenosis of left main, left circumflex, RCA but echocardiogram 9/29/2020 was normal with an EF of 65 to 70% and a stress echo in 9/2020 was negative.    #Prostate cancer:  -History of [place] 3+3 prostate cancer in 2005 status post prostatectomy with undetectable PSA levels    #Beta thalassemia trait  -Stable hemoglobin    #Healthcare maintenance:  -Needs a repeat colonoscopy given  he had 2 tubular adenomas in 3/2015 was supposed to repeat in 2017.    Yoandy Clay MD      Evaluation:  Huber Villalba was seen in consultation at the request of Dr. Lucie Ramos for evaluation as a potential kidney transplant recipient.    Reason for Visit:  Huber Villalba is a 80-year-old male with CKD from unknown etiology, who presents for kidney transplant evaluation.    History of Present Illness:  Mr. Villalba is an 80-year-old male with CKD from unknown etiology, remote history of nephrolithiasis, HTN, beta thalassemia trait, non-obstructive CAD, and prostate cancer.          Kidney Disease Hx: significant family history of kidney disease with 2 brothers requiring dialysis. Etiology not known. Creatinine mid 1's mg/dl up until 2016 with progressive decline since. eGFR 14 ml/min. Not on dialysis. No uremic symptoms.        Kidney Disease Dx: Unknown etiology       Biopsy Proven: No         On Dialysis: No       Primary Nephrologist: Dr. Villalba       H/o Kidney Stones: Yes; remote history required stone retrieval        H/o Recurrent/Frequent UTI: No         Diabetic Hx: None           Cardiac/Vascular Disease Risk Factors:        - Non-obstructive CAD seen on October 2016 coronary angiogram: 30% stenoses of the left main, circumflex, RCA       - Normal exercise stress ECHO September 2020         Viral Serology Status       CMV IgG Antibody: Unknown       EBV IgG Antibody: Unknown         Volume Status/Weight:        Volume status: Euvolemic       Weight:  Acceptable BMI       BMI: Body mass index is 25.19 kg/m .         Functional Capacity/Frailty:        He is quite active with biking, yoga, and pilates a few times per week. He denies chest pain, SOB, or claudication symptoms with exertion.    Fatigue/Decreased Energy: [x] No [] Yes    Chest Pain or SOB with Exertion: [x] No [] Yes    Significant Weight Change: [x] No [] Yes    Nausea, Vomiting or Diarrhea: [x] No [] Yes    Fever, Sweats or Chills:  [x] No  [] Yes    Leg Swelling [x] No [] Yes      Review of Systems:  A comprehensive review of systems was obtained and negative, except as noted in the HPI or PMH.    Past Medical History:   Medical record was reviewed and PMH was discussed with patient and noted below.  Past Medical History:   Diagnosis Date     Beta thalassemia (H)      Chronic kidney disease, stage V (H)      Hypertension      Kidney stone      Prostate cancer (H)        Past Social History:   Past Surgical History:   Procedure Laterality Date     ARTHROPLASTY SHOULDER       AS TOTAL KNEE ARTHROPLASTY Left      LAPAROSCOPIC PROSTATECTOMY  03/2005     LITHOTRIPSY       RELEASE CARPAL TUNNEL       Personal history of bleeding or anesthesia problems: No    Family History:  Family History   Problem Relation Age of Onset     Kidney Disease Brother      Kidney Disease Brother        Personal History:   Social History     Socioeconomic History     Marital status:      Spouse name: Not on file     Number of children: Not on file     Years of education: Not on file     Highest education level: Not on file   Occupational History     Not on file   Social Needs     Financial resource strain: Not on file     Food insecurity     Worry: Not on file     Inability: Not on file     Transportation needs     Medical: Not on file     Non-medical: Not on file   Tobacco Use     Smoking status: Never Smoker     Smokeless tobacco: Never Used   Substance and Sexual Activity     Alcohol use: Not Currently     Frequency: Never     Binge frequency: Never     Drug use: Never     Sexual activity: Not on file   Lifestyle     Physical activity     Days per week: Not on file     Minutes per session: Not on file     Stress: Not on file   Relationships     Social connections     Talks on phone: Not on file     Gets together: Not on file     Attends Mu-ism service: Not on file     Active member of club or organization: Not on file     Attends meetings of clubs or organizations:  "Not on file     Relationship status: Not on file     Intimate partner violence     Fear of current or ex partner: Not on file     Emotionally abused: Not on file     Physically abused: Not on file     Forced sexual activity: Not on file   Other Topics Concern     Parent/sibling w/ CABG, MI or angioplasty before 65F 55M? Not Asked   Social History Narrative     Not on file       Allergies:  Allergies   Allergen Reactions     Nsaids Other (See Comments) and Unknown       Medications:  Current Outpatient Medications   Medication Sig     amLODIPine (NORVASC) 10 MG tablet      atorvastatin (LIPITOR) 10 MG tablet      cyanocobalamin (VITAMIN B-12) 1000 MCG tablet Take 1,000 mcg by mouth     metoprolol succinate ER (TOPROL-XL) 50 MG 24 hr tablet      Turmeric Curcumin 500 MG CAPS Take 1 tablet by mouth     vitamin D3 (CHOLECALCIFEROL) 2000 units (50 mcg) tablet Take 1 tablet by mouth     hydrochlorothiazide (HYDRODIURIL) 12.5 MG tablet      No current facility-administered medications for this visit.        Vitals:  /73   Pulse 69   Ht 1.575 m (5' 2\")   Wt 62.5 kg (137 lb 11.2 oz)   SpO2 100%   BMI 25.19 kg/m      Exam:  GENERAL APPEARANCE: alert and no distress  HENT: mouth without ulcers or lesions  LYMPHATICS: no cervical or supraclavicular nodes  RESP: lungs clear to auscultation - no rales, rhonchi or wheezes  CV: regular rhythm, normal rate, no rub, no murmur  EDEMA: no LE edema bilaterally  ABDOMEN: soft, nondistended, nontender, bowel sounds normal  MS: extremities normal - no gross deformities noted, no evidence of inflammation in joints, no muscle tenderness  SKIN: no rash    Results:   Recent Results (from the past 336 hour(s))   EKG 12-lead, tracing only [EKG1]    Collection Time: 04/26/21 12:13 PM   Result Value Ref Range    Interpretation ECG Click View Image link to view waveform and result    UA with Microscopic reflex to Culture    Collection Time: 04/26/21 12:53 PM    Specimen: Midstream Urine "   Result Value Ref Range    Color Urine Yellow     Appearance Urine Clear     Glucose Urine 50 (A) NEG^Negative mg/dL    Bilirubin Urine Negative NEG^Negative    Ketones Urine Negative NEG^Negative mg/dL    Specific Gravity Urine 1.011 1.003 - 1.035    Blood Urine Negative NEG^Negative    pH Urine 5.0 5.0 - 7.0 pH    Protein Albumin Urine 100 (A) NEG^Negative mg/dL    Urobilinogen mg/dL 0.0 0.0 - 2.0 mg/dL    Nitrite Urine Negative NEG^Negative    Leukocyte Esterase Urine Negative NEG^Negative    Source Midstream Urine     WBC Urine 0 0 - 5 /HPF    RBC Urine <1 0 - 2 /HPF    Mucous Urine Present (A) NEG^Negative /LPF   Partial thromboplastin time [LAB56]    Collection Time: 04/26/21  1:02 PM   Result Value Ref Range    PTT 29 22 - 37 sec   INR [SGD2871]    Collection Time: 04/26/21  1:02 PM   Result Value Ref Range    INR 0.93 0.86 - 1.14   Blood Group A Subtype    Collection Time: 04/26/21  1:02 PM   Result Value Ref Range    Antigen Type Canceled, Test credited     Blood Bank Comment       Patient is not ABO blood type A or AB. A subtyping not applicable. ed 4/26/21.   Comprehensive metabolic panel [LAB17]    Collection Time: 04/26/21  1:02 PM   Result Value Ref Range    Sodium 139 133 - 144 mmol/L    Potassium 4.4 3.4 - 5.3 mmol/L    Chloride 107 94 - 109 mmol/L    Carbon Dioxide 24 20 - 32 mmol/L    Anion Gap 8 3 - 14 mmol/L    Glucose 108 (H) 70 - 99 mg/dL    Urea Nitrogen 56 (H) 7 - 30 mg/dL    Creatinine 3.44 (H) 0.66 - 1.25 mg/dL    GFR Estimate 16 (L) >60 mL/min/[1.73_m2]    GFR Estimate If Black 18 (L) >60 mL/min/[1.73_m2]    Calcium 8.9 8.5 - 10.1 mg/dL    Bilirubin Total 0.4 0.2 - 1.3 mg/dL    Albumin 3.9 3.4 - 5.0 g/dL    Protein Total 8.2 6.8 - 8.8 g/dL    Alkaline Phosphatase 70 40 - 150 U/L    ALT 28 0 - 70 U/L    AST 18 0 - 45 U/L   CBC with platelets differential [HMM596]    Collection Time: 04/26/21  1:02 PM   Result Value Ref Range    WBC 6.5 4.0 - 11.0 10e9/L    RBC Count 4.50 4.4 - 5.9 10e12/L     Hemoglobin 9.0 (L) 13.3 - 17.7 g/dL    Hematocrit 30.1 (L) 40.0 - 53.0 %    MCV 67 (L) 78 - 100 fl    MCH 20.0 (L) 26.5 - 33.0 pg    MCHC 29.9 (L) 31.5 - 36.5 g/dL    RDW 17.3 (H) 10.0 - 15.0 %    Platelet Count 230 150 - 450 10e9/L    Diff Method Automated Method     % Neutrophils 69.7 %    % Lymphocytes 19.4 %    % Monocytes 9.4 %    % Eosinophils 0.8 %    % Basophils 0.2 %    % Immature Granulocytes 0.5 %    Nucleated RBCs 0 0 /100    Absolute Neutrophil 4.6 1.6 - 8.3 10e9/L    Absolute Lymphocytes 1.3 0.8 - 5.3 10e9/L    Absolute Monocytes 0.6 0.0 - 1.3 10e9/L    Absolute Eosinophils 0.1 0.0 - 0.7 10e9/L    Absolute Basophils 0.0 0.0 - 0.2 10e9/L    Abs Immature Granulocytes 0.0 0 - 0.4 10e9/L    Absolute Nucleated RBC 0.0    ABO/Rh type and screen [SVP568]    Collection Time: 04/26/21  1:02 PM   Result Value Ref Range    ABO O     RH(D) Pos     Antibody Screen Neg     Test Valid Only At          [facility] [place],[facility]    Specimen Expires 04/29/2021

## 2021-04-26 NOTE — LETTER
4/26/2021         RE: Huber Villalba  [address] [facility], [place] [address]        Dear Colleague,    Thank you for referring your patient, Huber Villalba, to the [facility]. Please see a copy of my visit note below.    TRANSPLANT NEPHROLOGY RECIPIENT EVALUATION NOTE    Assessment and Plan:  # Kidney Transplant Evaluation: Patient is a good candidate overall. Benefits of a living donor transplant were discussed. Due to age, LDKT is likely to be the best option.     # CKD Stage 5 from Unknown Etiology: not yet on dialysis, eGFR 14 ml/min, and overall feeling OK, but may benefit from a kidney transplant.    # Cardiac Risk: he has known non-obstructive CAD seen on 2016 coronary angiogram (no PCI) and normal exercise stress ECHO September 2020. He is asymptomatic with exertion.    # Functional Status: appears intact as he is quite physically active for his age (biking, yoga, pilates).    # Prostate Cancer: Yaritza 3+3 s/p prostatectomy in 2005 with undetectable PSA levels.    # Beta Thalassemia: stable hemoglobin.     # Health Maintenance: Colonoscopy: due (last completed 2015 with recommendations to repeat 3 years) and Dental: Up to date    Discussed the risks and benefits of a transplant, including the risk of surgery and immunosuppression medications.  Patient's overall evaluation will be discussed in the Transplant Program's regular meeting with a final recommendation on the patients suitability for transplant to be made at that time.    Pending completion of the full evaluation, patient presently appears to be enough of an acceptable kidney transplant recipient candidate to have any potential kidney donors start the evaluation process.  Patient was seen in conjunction with Dr. Yoandy Clay as part of a shared visit.    PHYSICIAN ATTESTATION AND EVALUATION  Patient was seen by myself, Dr. Yoandy Clay, in conjunction with Olga Dickerson PA-C as part of a shared visit.    I  personally reviewed the patient's past medical and surgical history, vital signs, medications and pertinent laboratory results and radiology findings.  Present and past medical history, along with significant physical exam findings were all reviewed with IOANA.    Gates findings:  Huber Villalba is a 80-year-old male (patient states that his biological age is 78), retired executive, strong family history of chronic kidney disease of unclear cause with 2 brothers on dialysis, history of coronary artery disease with left heart catheterization 10/2016 showing 30% stenosis of the left main, left circumflex, RCA, CKD 5 with most recent GFR of 14 mL/min on 3/10/2021, history of prostate adenocarcinoma Yaritza 3+3 in 2005 status post prostatectomy with negative PSAs, last colonoscopy in 2015 with plan to repeat in 2 years which he has not done presenting for evaluation for kidney transplantation.    The patient appears to be a good candidate for kidney transplantation but due to his age he will require a living donor kidney transplant.      Key management decisions made by me and discussed with IOANA include the following, with full Assessment and Plan noted above by IOANA:  #Kidney transplant evaluation:  -Patient is a good candidate overall.  Benefits of a living donor kidney transplant were explained.  -He is only a living kidney donor transplant candidate due to his age  -He will need a CT of his abdomen and pelvis with ultrasound of the iliacs  -He is up-to-date with cardiac testing  -He will need a repeat colonoscopy    #Chronic kidney disease stage V from unclear cause:  -Strong family history of kidney failure with 2 of his brothers on dialysis    #Cardiac risk:  -Low to moderate.  Patient does have a history of coronary artery disease based on his heart catheterization 10/2016 that showed 30% stenosis of left main, left circumflex, RCA but echocardiogram 9/29/2020 was normal with an EF of 65 to 70% and a stress echo in  9/2020 was negative.    #Prostate cancer:  -History of Yaritza 3+3 prostate cancer in 2005 status post prostatectomy with undetectable PSA levels    #Beta thalassemia trait  -Stable hemoglobin    #Healthcare maintenance:  -Needs a repeat colonoscopy given he had 2 tubular adenomas in 3/2015 was supposed to repeat in 2017.    Yoandy Clay MD      Evaluation:  Huber Villalba was seen in consultation at the request of Dr. Lucie Ramos for evaluation as a potential kidney transplant recipient.    Reason for Visit:  Huber Villalba is a 80-year-old male with CKD from unknown etiology, who presents for kidney transplant evaluation.    History of Present Illness:  Mr. Villalba is an 80-year-old male with CKD from unknown etiology, remote history of nephrolithiasis, HTN, beta thalassemia trait, non-obstructive CAD, and prostate cancer.          Kidney Disease Hx: significant family history of kidney disease with 2 brothers requiring dialysis. Etiology not known. Creatinine mid 1's mg/dl up until 2016 with progressive decline since. eGFR 14 ml/min. Not on dialysis. No uremic symptoms.        Kidney Disease Dx: Unknown etiology       Biopsy Proven: No         On Dialysis: No       Primary Nephrologist: Dr. Villalba       H/o Kidney Stones: Yes; remote history required stone retrieval        H/o Recurrent/Frequent UTI: No         Diabetic Hx: None           Cardiac/Vascular Disease Risk Factors:        - Non-obstructive CAD seen on October 2016 coronary angiogram: 30% stenoses of the left main, circumflex, RCA       - Normal exercise stress ECHO September 2020         Viral Serology Status       CMV IgG Antibody: Unknown       EBV IgG Antibody: Unknown         Volume Status/Weight:        Volume status: Euvolemic       Weight:  Acceptable BMI       BMI: Body mass index is 25.19 kg/m .         Functional Capacity/Frailty:        He is quite active with biking, yoga, and pilates a few times per week. He denies chest pain, SOB, or  claudication symptoms with exertion.    Fatigue/Decreased Energy: [x] No [] Yes    Chest Pain or SOB with Exertion: [x] No [] Yes    Significant Weight Change: [x] No [] Yes    Nausea, Vomiting or Diarrhea: [x] No [] Yes    Fever, Sweats or Chills:  [x] No [] Yes    Leg Swelling [x] No [] Yes      Review of Systems:  A comprehensive review of systems was obtained and negative, except as noted in the HPI or PMH.    Past Medical History:   Medical record was reviewed and PMH was discussed with patient and noted below.  Past Medical History:   Diagnosis Date     Beta thalassemia (H)      Chronic kidney disease, stage V (H)      Hypertension      Kidney stone      Prostate cancer (H)        Past Social History:   Past Surgical History:   Procedure Laterality Date     ARTHROPLASTY SHOULDER       AS TOTAL KNEE ARTHROPLASTY Left      LAPAROSCOPIC PROSTATECTOMY  03/2005     LITHOTRIPSY       RELEASE CARPAL TUNNEL       Personal history of bleeding or anesthesia problems: No    Family History:  Family History   Problem Relation Age of Onset     Kidney Disease Brother      Kidney Disease Brother        Personal History:   Social History     Socioeconomic History     Marital status:      Spouse name: Not on file     Number of children: Not on file     Years of education: Not on file     Highest education level: Not on file   Occupational History     Not on file   Social Needs     Financial resource strain: Not on file     Food insecurity     Worry: Not on file     Inability: Not on file     Transportation needs     Medical: Not on file     Non-medical: Not on file   Tobacco Use     Smoking status: Never Smoker     Smokeless tobacco: Never Used   Substance and Sexual Activity     Alcohol use: Not Currently     Frequency: Never     Binge frequency: Never     Drug use: Never     Sexual activity: Not on file   Lifestyle     Physical activity     Days per week: Not on file     Minutes per session: Not on file     Stress: Not  "on file   Relationships     Social connections     Talks on phone: Not on file     Gets together: Not on file     Attends [employer] service: Not on file     Active member of club or organization: Not on file     Attends meetings of clubs or organizations: Not on file     Relationship status: Not on file     Intimate partner violence     Fear of current or ex partner: Not on file     Emotionally abused: Not on file     Physically abused: Not on file     Forced sexual activity: Not on file   Other Topics Concern     Parent/sibling w/ CABG, MI or angioplasty before 65F 55M? Not Asked   Social History Narrative     Not on file       Allergies:  Allergies   Allergen Reactions     Nsaids Other (See Comments) and Unknown       Medications:  Current Outpatient Medications   Medication Sig     amLODIPine (NORVASC) 10 MG tablet      atorvastatin (LIPITOR) 10 MG tablet      cyanocobalamin (VITAMIN B-12) 1000 MCG tablet Take 1,000 mcg by mouth     metoprolol succinate ER (TOPROL-XL) 50 MG 24 hr tablet      Turmeric Curcumin 500 MG CAPS Take 1 tablet by mouth     vitamin D3 (CHOLECALCIFEROL) 2000 units (50 mcg) tablet Take 1 tablet by mouth     hydrochlorothiazide (HYDRODIURIL) 12.5 MG tablet      No current facility-administered medications for this visit.        Vitals:  /73   Pulse 69   Ht 1.575 m (5' 2\")   Wt 62.5 kg (137 lb 11.2 oz)   SpO2 100%   BMI 25.19 kg/m      Exam:  GENERAL APPEARANCE: alert and no distress  HENT: mouth without ulcers or lesions  LYMPHATICS: no cervical or supraclavicular nodes  RESP: lungs clear to auscultation - no rales, rhonchi or wheezes  CV: regular rhythm, normal rate, no rub, no murmur  EDEMA: no LE edema bilaterally  ABDOMEN: soft, nondistended, nontender, bowel sounds normal  MS: extremities normal - no gross deformities noted, no evidence of inflammation in joints, no muscle tenderness  SKIN: no rash    Results:   Recent Results (from the past 336 hour(s))   EKG 12-lead, tracing " only [EKG1]    Collection Time: 04/26/21 12:13 PM   Result Value Ref Range    Interpretation ECG Click View Image link to view waveform and result    UA with Microscopic reflex to Culture    Collection Time: 04/26/21 12:53 PM    Specimen: Midstream Urine   Result Value Ref Range    Color Urine Yellow     Appearance Urine Clear     Glucose Urine 50 (A) NEG^Negative mg/dL    Bilirubin Urine Negative NEG^Negative    Ketones Urine Negative NEG^Negative mg/dL    Specific Gravity Urine 1.011 1.003 - 1.035    Blood Urine Negative NEG^Negative    pH Urine 5.0 5.0 - 7.0 pH    Protein Albumin Urine 100 (A) NEG^Negative mg/dL    Urobilinogen mg/dL 0.0 0.0 - 2.0 mg/dL    Nitrite Urine Negative NEG^Negative    Leukocyte Esterase Urine Negative NEG^Negative    Source Midstream Urine     WBC Urine 0 0 - 5 /HPF    RBC Urine <1 0 - 2 /HPF    Mucous Urine Present (A) NEG^Negative /LPF   Partial thromboplastin time [LAB56]    Collection Time: 04/26/21  1:02 PM   Result Value Ref Range    PTT 29 22 - 37 sec   INR [NMT7922]    Collection Time: 04/26/21  1:02 PM   Result Value Ref Range    INR 0.93 0.86 - 1.14   Blood Group A Subtype    Collection Time: 04/26/21  1:02 PM   Result Value Ref Range    Antigen Type Canceled, Test credited     Blood Bank Comment       Patient is not ABO blood type A or AB. A subtyping not applicable. ed 4/26/21.   Comprehensive metabolic panel [LAB17]    Collection Time: 04/26/21  1:02 PM   Result Value Ref Range    Sodium 139 133 - 144 mmol/L    Potassium 4.4 3.4 - 5.3 mmol/L    Chloride 107 94 - 109 mmol/L    Carbon Dioxide 24 20 - 32 mmol/L    Anion Gap 8 3 - 14 mmol/L    Glucose 108 (H) 70 - 99 mg/dL    Urea Nitrogen 56 (H) 7 - 30 mg/dL    Creatinine 3.44 (H) 0.66 - 1.25 mg/dL    GFR Estimate 16 (L) >60 mL/min/[1.73_m2]    GFR Estimate If Black 18 (L) >60 mL/min/[1.73_m2]    Calcium 8.9 8.5 - 10.1 mg/dL    Bilirubin Total 0.4 0.2 - 1.3 mg/dL    Albumin 3.9 3.4 - 5.0 g/dL    Protein Total 8.2 6.8 - 8.8  g/dL    Alkaline Phosphatase 70 40 - 150 U/L    ALT 28 0 - 70 U/L    AST 18 0 - 45 U/L   CBC with platelets differential [ISE413]    Collection Time: 04/26/21  1:02 PM   Result Value Ref Range    WBC 6.5 4.0 - 11.0 10e9/L    RBC Count 4.50 4.4 - 5.9 10e12/L    Hemoglobin 9.0 (L) 13.3 - 17.7 g/dL    Hematocrit 30.1 (L) 40.0 - 53.0 %    MCV 67 (L) 78 - 100 fl    MCH 20.0 (L) 26.5 - 33.0 pg    MCHC 29.9 (L) 31.5 - 36.5 g/dL    RDW 17.3 (H) 10.0 - 15.0 %    Platelet Count 230 150 - 450 10e9/L    Diff Method Automated Method     % Neutrophils 69.7 %    % Lymphocytes 19.4 %    % Monocytes 9.4 %    % Eosinophils 0.8 %    % Basophils 0.2 %    % Immature Granulocytes 0.5 %    Nucleated RBCs 0 0 /100    Absolute Neutrophil 4.6 1.6 - 8.3 10e9/L    Absolute Lymphocytes 1.3 0.8 - 5.3 10e9/L    Absolute Monocytes 0.6 0.0 - 1.3 10e9/L    Absolute Eosinophils 0.1 0.0 - 0.7 10e9/L    Absolute Basophils 0.0 0.0 - 0.2 10e9/L    Abs Immature Granulocytes 0.0 0 - 0.4 10e9/L    Absolute Nucleated RBC 0.0    ABO/Rh type and screen [CMP652]    Collection Time: 04/26/21  1:02 PM   Result Value Ref Range    ABO O     RH(D) Pos     Antibody Screen Neg     Test Valid Only At          [facility]    Specimen Expires 04/29/2021      Again, thank you for allowing me to participate in the care of your patient.        Sincerely,        NIRANJAN

## 2021-04-26 NOTE — PROGRESS NOTES
Transplant Surgery Consult Note     Medical record number: [id number]  YOB: 1940,   Consult requested by Dr Conor Villalba for evaluation of kidney transplant candidacy.    77 yo (recorded 80 year old) male with CKD stage 5 unknown etio  GFR 14, still active  Exercises regularly  Prostate CA 2005 S/P open prostatectomy 2005  Good surgical risk  Has had a negative cath a few years ago and a negative stress test in Sept 2020  Has a wife and 2 children in their 40s who are potential live donors  Would like to set up a virtual visit with me to meet him and family together to talk about live donation  Looking at this overall status I think live donor is the only option    Risks of the surgical procedure including but not limited to the rare risk of mortality discussed in detail. Patient verbalized good understanding and had several pertinent questions which were answered satisfactorily.     Immunosuppressive regimen, management and long term risks discussed in detail.     Assessment and Recommendations:Mr. Villalba appears to be a good candidate for kidney transplantation and has a good understanding of the risks and benefits of this approach to the management of renal failure. The following issues should be addressed prior to finalizing his transplant candidacy:     Transplant order: Mr. Villalba has Chronic renal failure due to unknown etiology whose condition is not expected to resolve, is expected to progress, and is expected to continue to develop related comorbid conditions.  Recommend he be considered as a candidate for kidney.  Cardiology consult for cardiac risk stratification to be ordered: Yes  CT abdomen and pelvis without contrast to be ordered for assessment of vascular targets: Yes  Transplant listing labs ordered to include HLA, ABOx2, Cr, etc.  Dietician consult ordered: Yes  Social work consult ordered: Yes  Imaging reports reviewed:  yes  Radiology images reviewed:no  Recipient suitable to move  forward with work up of living donors:  Yes      The majority of our visit was spent in counselling, discussing the medical and surgical risks of kidney transplantation. We discussed approximate wait time and how that is influenced by issues such as blood type and sensitization (PRA) and access to a living donor. I contrasted potential waiting time for living vs  donor kidneys from  normal (0-85%) or higher (%) kidney donor profile index (KDPI) donors and their associated outcomes. I would not recommend this individual to consider kidneys from high KDPI donors. The reason for this decision is best summarized as: multiple potential living donors. Potential surgical complications of kidney transplantation include bleeding, superficial or deep wound complications (infection, hernia, lymphocele), ureteral anastomotic failure (leak or stenosis), graft thrombosis, need for reoperation and other issues such as cardiac complications, pneumonia, deep venous thrombosis, pulmonary embolism, post transplant diabetes and death. The potential for recurrent disease or need for retransplantation was also addressed. We discussed the possible need for ureteral stent (and subsequent removal), and the utility of protocol biopsy and laboratory studies to evaluate for rejection or recurrent disease. We discussed the risk of graft rejection, our center's average graft and patient survival rates, immunosuppression protocols, as well as the potential opportunity to participate in clinical trials.  We also discussed the average length of stay, recovery process, and posttransplant lab and monitoring protocol.  I emphasized the need for strict immunosuppression medication adherence and the potential for complications of immunosuppression such as skin cancer or lymphoma, as well as a very low but not zero risk of donor-derived disease transmission risks (infection, cancer). Mr. Villalba asked good questions and his candidacy will be  reviewed at our Multidisciplinary Selection Committee. Thank you for the opportunity to participate in Mr. Villalba's care.      Total time: 60 minutes  Counselling time: 40 minutes    .    ---------------------------------------------------------------------------------------------------    HPI: Mr. Villalba has Chronic renal failure due to unknown etiology. The patient is non-diabetic.       The patient is not on dialysis.    Has potential kidney donors:  Yes .  Interested in participation in paired exchange if a donor is willing: Yes     The patient has the following pertinent history:       No    Yes  Dialysis:    []      [] via:       Blood Transfusion                  []      []  Number of units:   Most recently:  Pregnancy:    []      [] Number:       Previous Transplant:  []      [] Details:    Cancer    []      [] Comment:   Kidney stones   []      [] Comment:      Recurrent infections  []      []  Type:                  Bladder dysfunction  []      [] Cause:    Claudication   []      [] Distance:    Previous Amputation  []      [] Cause:     Chronic anticoagulation  []      [] Indication:   Shinto  []      []      Past Medical History:   Diagnosis Date     Beta thalassemia minor      Cancer (H)     Prostate Cancer      Chronic kidney disease      Hypertension      Kidney stone      Mixed hyperlipidemia      Past Surgical History:   Procedure Laterality Date     ARTHROPLASTY SHOULDER       AS TOTAL KNEE ARTHROPLASTY Left      LAPAROSCOPIC PROSTATECTOMY  03/2005     LITHOTRIPSY       RELEASE CARPAL TUNNEL       No family history on file.  Social History     Socioeconomic History     Marital status:      Spouse name: Not on file     Number of children: Not on file     Years of education: Not on file     Highest education level: Not on file   Occupational History     Not on file   Social Needs     Financial resource strain: Not on file     Food insecurity     Worry: Not on file     Inability: Not  on file     Transportation needs     Medical: Not on file     Non-medical: Not on file   Tobacco Use     Smoking status: Never Smoker     Smokeless tobacco: Never Used   Substance and Sexual Activity     Alcohol use: Not Currently     Frequency: Never     Binge frequency: Never     Drug use: Never     Sexual activity: Not on file   Lifestyle     Physical activity     Days per week: Not on file     Minutes per session: Not on file     Stress: Not on file   Relationships     Social connections     Talks on phone: Not on file     Gets together: Not on file     Attends [employer] service: Not on file     Active member of club or organization: Not on file     Attends meetings of clubs or organizations: Not on file     Relationship status: Not on file     Intimate partner violence     Fear of current or ex partner: Not on file     Emotionally abused: Not on file     Physically abused: Not on file     Forced sexual activity: Not on file   Other Topics Concern     Parent/sibling w/ CABG, MI or angioplasty before 65F 55M? Not Asked   Social History Narrative     Not on file       ROS:   CONSTITUTIONAL:  No fevers or chills  EYES: negative for icterus  ENT:  negative for hearing loss, tinnitus and sore throat  RESPIRATORY:  negative for cough, sputum, dyspnea  CARDIOVASCULAR:  negative for chest pain Fatigue  None  GASTROINTESTINAL:  negative for nausea, vomiting, diarrhea or constipation  GENITOURINARY:  negative for incontinence, dysuria, bladder emptying problems  HEME:  No easy bruising  INTEGUMENT:  negative for rash and pruritus  NEURO:  Negative for headache, seizure disorder  Allergies:   Allergies   Allergen Reactions     Nsaids Other (See Comments) and Unknown     Medications:  Prescription Medications as of 4/26/2021       Rx Number Disp Refills Start End Last Dispensed Date Next Fill Date Owning Pharmacy    amLODIPine (NORVASC) 10 MG tablet   2 8/20/2019        Class: Historical    atorvastatin (LIPITOR) 10 MG  tablet   1 10/3/2019        Class: Historical    cyanocobalamin (VITAMIN B-12) 1000 MCG tablet            Sig: Take 1,000 mcg by mouth    Class: Historical    Route: Oral    hydrochlorothiazide (HYDRODIURIL) 12.5 MG tablet   2 8/12/2019        Class: Historical    metoprolol succinate ER (TOPROL-XL) 50 MG 24 hr tablet   2 8/20/2019        Class: Historical    Turmeric Curcumin 500 MG CAPS            Sig: Take 1 tablet by mouth    Class: Historical    Route: Oral    vitamin D3 (CHOLECALCIFEROL) 2000 units (50 mcg) tablet    1/4/2013        Sig: Take 1 tablet by mouth    Class: Historical    Route: Oral        Exam:     There were no vitals taken for this visit.  Appearance: in no apparent distress.   Skin: normal  Eyes:  no redness or discharge.  Sclera anicteric  Head and Neck: Normal, no rashes or jaundice  Respiratory: easy respirations, no audible wheezing.  Abdomen: rounded, Surgical scars consistent with history     Neuro: without deficit   Psychiatric: Normal mood and affect    Diagnostics:   Recent Results (from the past 672 hour(s))   EKG 12-lead, tracing only [EKG1]    Collection Time: 04/26/21 12:13 PM   Result Value Ref Range    Interpretation ECG Click View Image link to view waveform and result    UA with Microscopic reflex to Culture    Collection Time: 04/26/21 12:53 PM    Specimen: Midstream Urine   Result Value Ref Range    Color Urine Yellow     Appearance Urine Clear     Glucose Urine 50 (A) NEG^Negative mg/dL    Bilirubin Urine Negative NEG^Negative    Ketones Urine Negative NEG^Negative mg/dL    Specific Gravity Urine 1.011 1.003 - 1.035    Blood Urine Negative NEG^Negative    pH Urine 5.0 5.0 - 7.0 pH    Protein Albumin Urine 100 (A) NEG^Negative mg/dL    Urobilinogen mg/dL 0.0 0.0 - 2.0 mg/dL    Nitrite Urine Negative NEG^Negative    Leukocyte Esterase Urine Negative NEG^Negative    Source Midstream Urine     WBC Urine 0 0 - 5 /HPF    RBC Urine <1 0 - 2 /HPF    Mucous Urine Present (A)  NEG^Negative /LPF   Partial thromboplastin time [LAB56]    Collection Time: 04/26/21  1:02 PM   Result Value Ref Range    PTT 29 22 - 37 sec   INR [OBT5519]    Collection Time: 04/26/21  1:02 PM   Result Value Ref Range    INR 0.93 0.86 - 1.14   Comprehensive metabolic panel [LAB17]    Collection Time: 04/26/21  1:02 PM   Result Value Ref Range    Sodium 139 133 - 144 mmol/L    Potassium 4.4 3.4 - 5.3 mmol/L    Chloride 107 94 - 109 mmol/L    Carbon Dioxide 24 20 - 32 mmol/L    Anion Gap 8 3 - 14 mmol/L    Glucose 108 (H) 70 - 99 mg/dL    Urea Nitrogen 56 (H) 7 - 30 mg/dL    Creatinine 3.44 (H) 0.66 - 1.25 mg/dL    GFR Estimate 16 (L) >60 mL/min/[1.73_m2]    GFR Estimate If Black 18 (L) >60 mL/min/[1.73_m2]    Calcium 8.9 8.5 - 10.1 mg/dL    Bilirubin Total 0.4 0.2 - 1.3 mg/dL    Albumin 3.9 3.4 - 5.0 g/dL    Protein Total 8.2 6.8 - 8.8 g/dL    Alkaline Phosphatase 70 40 - 150 U/L    ALT 28 0 - 70 U/L    AST 18 0 - 45 U/L   CBC with platelets differential [PMY604]    Collection Time: 04/26/21  1:02 PM   Result Value Ref Range    WBC 6.5 4.0 - 11.0 10e9/L    RBC Count 4.50 4.4 - 5.9 10e12/L    Hemoglobin 9.0 (L) 13.3 - 17.7 g/dL    Hematocrit 30.1 (L) 40.0 - 53.0 %    MCV 67 (L) 78 - 100 fl    MCH 20.0 (L) 26.5 - 33.0 pg    MCHC 29.9 (L) 31.5 - 36.5 g/dL    RDW 17.3 (H) 10.0 - 15.0 %    Platelet Count 230 150 - 450 10e9/L    Diff Method Automated Method     % Neutrophils 69.7 %    % Lymphocytes 19.4 %    % Monocytes 9.4 %    % Eosinophils 0.8 %    % Basophils 0.2 %    % Immature Granulocytes 0.5 %    Nucleated RBCs 0 0 /100    Absolute Neutrophil 4.6 1.6 - 8.3 10e9/L    Absolute Lymphocytes 1.3 0.8 - 5.3 10e9/L    Absolute Monocytes 0.6 0.0 - 1.3 10e9/L    Absolute Eosinophils 0.1 0.0 - 0.7 10e9/L    Absolute Basophils 0.0 0.0 - 0.2 10e9/L    Abs Immature Granulocytes 0.0 0 - 0.4 10e9/L    Absolute Nucleated RBC 0.0    ABO/Rh type and screen [[id number]]    Collection Time: 04/26/21  1:02 PM   Result Value Ref Range     ABO PENDING     Antibody Screen PENDING     Test Valid Only At          [facility]    Specimen Expires 04/29/2021      No results found for: CPRA

## 2021-04-26 NOTE — PROGRESS NOTES
Kidney Transplant Referral - 3/15/2021      Summary    Team s concerns/comments:   1) Cardiac risk assessment  2) PAD assessment  3) Prostate CA  4) Beta thalassemia  5) Health maintenance    Candidacy category: Yellow    Action/Plan: Due to age, will need living donor    1) EKG, Echo today. Normal stress echo 9/2020  2) Abd/pelvic CT, Abd/il US w doppler  3) S/P prostatectomy w negative PSA  4) Stable Hgb  5) Due for repeat colonoscopy, dental    Expected Selection Meeting Discussion: 5/5/21

## 2021-04-27 LAB
BLD GP AB SCN TITR SERPL: NORMAL {TITER}
CARDIOLIPIN ANTIBODY IGG: <1.6 GPL-U/ML (ref 0–19.9)
CARDIOLIPIN ANTIBODY IGM: 0.2 MPL-U/ML (ref 0–19.9)
CMV IGG SERPL QL IA: >8 AI (ref 0–0.8)
EBV VCA IGG SER QL IA: >8 AI (ref 0–0.8)
GAMMA INTERFERON BACKGROUND BLD IA-ACNC: 0 IU/ML
M TB IFN-G CD4+ BCKGRND COR BLD-ACNC: 10 IU/ML
M TB TUBERC IFN-G BLD QL: NEGATIVE
MITOGEN IGNF BCKGRD COR BLD-ACNC: 0 IU/ML
MITOGEN IGNF BCKGRD COR BLD-ACNC: 0 IU/ML
THROMBIN TIME: 15.9 SEC (ref 13–19)
VZV IGG SER QL IA: 5.7 AI (ref 0–0.8)

## 2021-04-28 LAB
INTERPRETATION ECG - MUSE: NORMAL
LA PPP-IMP: NEGATIVE

## 2021-04-29 LAB
ABO + RH BLD: NORMAL
ABO + RH BLD: NORMAL
COPATH REPORT: NORMAL
SPECIMEN EXP DATE BLD: NORMAL

## 2021-05-03 LAB
A*: NORMAL
A*LOCUS SEROLOGIC EQUIVALENT: 11
A*LOCUS: NORMAL
A*SEROLOGIC EQUIVALENT: 24
AB TEST METHOD: NORMAL
B*: NORMAL
B*LOCUS SEROLOGIC EQUIVALENT: 8
B*LOCUS: NORMAL
B*SEROLOGIC EQUIVALENT: 58
BW-1: NORMAL
BW-2: NORMAL
C*: NORMAL
C*LOCUS SEROLOGIC EQUIVALENT: 10
C*LOCUS: NORMAL
C*SEROLOGIC EQUIVALENT: 7
DPA1*: NORMAL
DPA1*LOCUS: NORMAL
DPB1*: NORMAL
DPB1*LOCUS NMDP: NORMAL
DPB1*LOCUS: NORMAL
DPB1*NMDP: NORMAL
DQA1*LOCUS: NORMAL
DQB1*LOCUS SEROLOGIC EQUIVALENT: 2
DQB1*LOCUS: NORMAL
DRB1*LOCUS SEROLOGIC EQUIVALENT: 17
DRB1*LOCUS: NORMAL
DRB3*LOCUS SEROLOGIC EQUIVALENT: 52
DRB3*LOCUS: NORMAL
DRSSO TEST METHOD: NORMAL
PROTOCOL CUTOFF: NORMAL
SA1 CELL: NORMAL
SA1 COMMENTS: NORMAL
SA1 HI RISK ABY: NORMAL
SA1 MOD RISK ABY: NORMAL
SA1 TEST METHOD: NORMAL
SA2 CELL: NORMAL
SA2 COMMENTS: NORMAL
SA2 HI RISK ABY UA: NORMAL
SA2 MOD RISK ABY: NORMAL
SA2 TEST METHOD: NORMAL
UNACCEPTABLE ANTIGEN: NORMAL
UNOS CPRA: 66

## 2021-05-05 ENCOUNTER — TELEPHONE (OUTPATIENT)
Dept: TRANSPLANT | Facility: CLINIC | Age: 81
End: 2021-05-05

## 2021-05-05 ENCOUNTER — COMMITTEE REVIEW (OUTPATIENT)
Dept: TRANSPLANT | Facility: CLINIC | Age: 81
End: 2021-05-05

## 2021-05-05 DIAGNOSIS — Z94.0 KIDNEY TRANSPLANT STATUS: ICD-10-CM

## 2021-05-05 DIAGNOSIS — Z76.82 AWAITING ORGAN TRANSPLANT: Primary | ICD-10-CM

## 2021-05-05 NOTE — TELEPHONE ENCOUNTER
Left message for patient to schedule CT Abdom[facility] and Pel[facility] and [facility].  Asked patient to call back to schedule.

## 2021-05-05 NOTE — COMMITTEE REVIEW
Abdominal Committee Review Note     Evaluation Date: 4/26/2021  Committee Review Date: 5/5/2021    Organ being evaluated for: Kidney    Transplant Phase: Evaluation  Transplant Status: Active    Transplant Coordinator: Katarzyna Lim  Transplant Surgeon:       Referring Physician: Referred Self    Primary Diagnosis: Other, Specify - unknown  Secondary Diagnosis:     Committee Review Members:  Nephrology Nehemias Dempsey MD, Fahad Pham MD   Pharmacy Melo Lorenzana, [facility]    - Clinical [place], [facility]   Transplant Marisabel Simons RN, Mickey Maurice MD, Adia Cruz, RN, May Em, SHAHRAM, Hernan Perez, RN, Sima Godinez, TRAVIS, Nichole Kumar, RN   Transplant Surgery Nell Reeves MD, MD       Transplant Eligibility: Irreversible chronic kidney disease treated w/dialysis or expected need for dialysis    Committee Review Decision: Approved    Relative Contraindications: Other, living donor only    Absolute Contraindications: None    Committee Chair Mickey Maurice MD verbally attested to the committee's decision.    Committee Discussion Details: Reviewed medical records and evaluation results to date. He is approved for living donor only due to his age and projected wait time. He will need CT a/p, iliacs, hepatitis B vaccine and colonoscopy. The issue of cardiology is when to have him seen. Team is recommending closer to transplant or in September when he is due for a stress test. Team would like him to be seen by surgeon every 6 months for a year to maintain candidacy. Patient will be called and transplant summary will be sent.

## 2021-05-05 NOTE — LETTER
May 5, 2021    Huber Villalba  [address] [id number]      Dear Huber,    It was a pleasure to start working with you toward the goal of a kidney transplant. Your pre-transplant evaluation began on 2021. Your evaluation results were discussed at our Multidisciplinary Selection Committee on May 5, 2021. The Committee is approving you for transplant with live donor only. This means you will only receive a kidney transplant if there is a live person willing to give you a kidney. The average wait time for a  donor kidney is 5 years which would put you at age 85. Living donor kidneys tend to work immediately and are better tolerated from a surgical perspective. The Committee would like you to complete the following items while looking for a donor:    1. We would like you to have a CT scan of the abdomen and pelvis to look at the vasculature and best place for a new kidney. Our  will call you with this appointment. Per your request, we will not schedule this until you have found a living donor.  2. We are asking you for an iliac ultrasound to look at the blood flow to your legs. Our  will call you with this appointment. Per your reques,t we will not schedule this until you have found a living donor.  3. In September you will be due for a repeat stress test and we will have you see a surgeon at that time to make sure you remain healthy enough for a transplant if you have not found a donor by then.   4. Please schedule a screening colonoscopy with your primary care provider and call me when complete so I can obtain the records.   5. Your blood work shows you do not have immunity to Hepatitis B. You will need the vaccination series and this may be done with your primary care provider. Call me when you have received the first vaccination.  6. Please schedule an appointment with your dentist and have any recommended work done. Call me when complete.  7. I am working with  Dr Ramos's office to arrange a zoom meeting for you and your family. Once we have a time I will let you know.    Once you have identified a living donor we will start the evaluation process. If a living donor is approved a transplant will be scheduled. Please have any potential live donors register now online with our program to initiate their evaluations at [facility] or call [phone]. You will be notified by our office in the event of an approved live donor.    If you have any questions please call me, e mail or My Chart message me.      Sincerely,       Carol Lim, RN, BSN Transplant Coordinator  [facility] [address]  [address] [place] [address], [place] [address]  Phone [phone]  Fax [phone]  [email or web address]    CC: Wilber Harrington, Edinson Hui, Conor Villalba

## 2021-05-06 ENCOUNTER — TELEPHONE (OUTPATIENT)
Dept: TRANSPLANT | Facility: CLINIC | Age: 81
End: 2021-05-06

## 2021-05-06 NOTE — TELEPHONE ENCOUNTER
Called Huber to discuss the outcome of the Selection Committee from yesterday 2021. He is approved for LD only due to his age and projected wait time for a  donor.He will need the following items: CT a/p, iliacs, cardiology in the fall with an updated stress test, colonoscopy, dental and hepatitis B. He does not want to do any further evaluation until he finds a LD. He is asking for a zoom meeting with Dr Ramos and his family members to review transplant and risks to donors. Message send to Merle. Transplant summary letter sent.

## 2021-05-11 ENCOUNTER — TELEPHONE (OUTPATIENT)
Dept: TRANSPLANT | Facility: CLINIC | Age: 81
End: 2021-05-11

## 2021-05-11 DIAGNOSIS — Z76.82 AWAITING ORGAN TRANSPLANT: Primary | ICD-10-CM

## 2021-05-11 NOTE — TELEPHONE ENCOUNTER
Spoke with the patient and confirmed Transplant Surgeon appointments on 6/7/21.  Informed patient an itinerary can be accessed on [employer].

## 2021-06-07 ENCOUNTER — VIRTUAL VISIT (OUTPATIENT)
Dept: TRANSPLANT | Facility: CLINIC | Age: 81
End: 2021-06-07
Attending: SURGERY
Payer: COMMERCIAL

## 2021-06-07 DIAGNOSIS — Z76.82 AWAITING ORGAN TRANSPLANT: ICD-10-CM

## 2021-06-07 PROCEDURE — 99215 OFFICE O/P EST HI 40 MIN: CPT | Mod: 95 | Performed by: SURGERY

## 2021-06-07 NOTE — PROGRESS NOTES
ZAKI is a 80 year old who is being evaluated via a billable video visit.      How would you like to obtain your AVS? MyChart  If the video visit is dropped, the invitation should be resent by: Text to cell phone: [phone]     Will anyone else be joining your video visit? YES  Please send appt link to Son: [email or web address]  Please send link to daughter: [email or web address]        Video Start Time: 3:29 PM  Video-Visit Details    Type of service:  Video Visit    Video End Time:4:07pm    Originating Location (pt. Location): Home    Distant Location (provider location):  [facility]     Platform used for Video Visit: [facility]       Transplant Surgery Consult Note     Medical record number: [id number]  YOB: 1940,   Consult requested by Dr. Villalba for evaluation of kidney transplant candidacy.    Assessment and Recommendations:Mr. Villalba appears to be a good candidate for kidney transplantation and has a good understanding of the risks and benefits of this approach to the management of renal failure. The following issues should be addressed prior to finalizing his transplant candidacy:     Mr. Villalba has Chronic renal failure due to unknown etiology whose condition is not expected to resolve, is expected to progress, and is expected to continue to develop related comorbid conditions.  Cardiology consult for cardiac risk stratification to be ordered: Yes  CT abdomen and pelvis without contrast to be ordered for assessment of vascular targets: Yes  Dietician consult ordered: Yes  Social work consult ordered: Yes  Transplant listing labs ordered to include HLA, ABOx2, Cr, etc.  Imaging reports reviewed:  Yes  Radiology images reviewed: yes  Recipient suitable to move forward with work up of living donors:  Yes         81 yo (legally), but 77 yo male with CKD stage 5 unknown   Beta thallesemia minor  GFR 14, patient is quite active at this stage  Exercises regularly  Has a wife and 2  children in the 40s  Would like me to do a virtual visit with him and family to explain the options to all  Surgically appears like a good candidate for LD tx  DDtx is not a good option for a variety of reasons. Fortunately he has options with LD  CT abd plain to assess vessels and Doppler as well  H/O prostatectomy thru midline scar for prostate cancer in   Cardiac cath in  with <20 percent stenosis  Has had a treadmill in 2020  Get above reports  All he may need is an echo    The majority of our visit was spent in counselling, discussing the medical and surgical risks of kidney transplantation. We discussed approximate wait time and how that is influenced by issues such as blood type and sensitization (PRA) and access to a living donor. I contrasted potential waiting time for living vs  donor kidneys from  normal (0-85%) or higher (%) kidney donor profile index (KDPI) donors and their associated outcomes. I would not recommend this individual to consider kidneys from high KDPI donors. The reason for this decision is best summarized as: not on dialysis yet. Potential surgical complications of kidney transplantation include bleeding, superficial or deep wound complications (infection, hernia, lymphocele), ureteral anastomotic failure (leak or stenosis), graft thrombosis, need for reoperation and other issues such as cardiac complications, pneumonia, deep venous thrombosis, pulmonary embolism, post transplant diabetes and death. The potential for recurrent disease or need for retransplantation was also addressed. We discussed the possible need for ureteral stent (and subsequent removal), and the utility of protocol biopsy and laboratory studies to evaluate for rejection or recurrent disease. We discussed the risk of graft rejection, our center's average graft and patient survival rates, immunosuppression protocols, as well as the potential opportunity to participate in clinical trials.  We  also discussed the average length of stay, recovery process, and posttransplant lab and monitoring protocol.  I emphasized the need for strict immunosuppression medication adherence and the potential for complications of immunosuppression such as skin cancer or lymphoma, as well as a very low but not zero risk of donor-derived disease transmission risks (infection, cancer). Mr. Villalba asked good questions and his candidacy will be reviewed at our Multidisciplinary Selection Committee. Thank you for the opportunity to participate in Mr. Villalba's care.    May Em, CNP    I have reviewed history, examined patient and discussed plan with the fellow/resident/IOANA.  I concur with the findings in this note.     Today's visit was spent in counseling patient along with family (daughter and wife) re LD tx, benefits, risks and immunosppressive protocols    Total time: 40 minutes      .  ---------------------------------------------------------------------------------------------------    HPI: Mr. Villalba has Chronic renal failure due to unknown etiology. The patient is non-diabetic.       The patient is not on dialysis.    Has potential kidney donors:  Doesn't know .  Interested in participation in paired exchange if a donor is willing: Doesn't know     The patient has the following pertinent history:       No    Yes  Dialysis:    [x]      [] via:       Blood Transfusion                  [x]      []  Number of units:   Most recently:  Pregnancy:    [x]      [] Number:       Previous Transplant:  [x]      [] Details:    Cancer    []      [x] Comment:  Prostate cancer   Kidney stones  []      [] Comment:      Recurrent infections  [x]      []  Type:                  Bladder dysfunction  [x]      [] Cause:    Claudication   [x]      [] Distance:    Previous Amputation  [x]      [] Cause:     Chronic anticoagulation [x]      [] Indication:   Anglican  [x]      []      Past Medical History:   Diagnosis Date     Beta  thalassemia (H)      Chronic kidney disease, stage V (H)      Hypertension      Kidney stone      Prostate cancer (H)      Past Surgical History:   Procedure Laterality Date     ARTHROPLASTY SHOULDER       AS TOTAL KNEE ARTHROPLASTY Left      LAPAROSCOPIC PROSTATECTOMY  03/2005     LITHOTRIPSY       RELEASE CARPAL TUNNEL       Family History   Problem Relation Age of Onset     Kidney Disease Brother      Kidney Disease Brother      Social History     Socioeconomic History     Marital status:      Spouse name: Not on file     Number of children: Not on file     Years of education: Not on file     Highest education level: Not on file   Occupational History     Not on file   Social Needs     Financial resource strain: Not on file     Food insecurity     Worry: Not on file     Inability: Not on file     Transportation needs     Medical: Not on file     Non-medical: Not on file   Tobacco Use     Smoking status: Never Smoker     Smokeless tobacco: Never Used   Substance and Sexual Activity     Alcohol use: Not Currently     Frequency: Never     Binge frequency: Never     Drug use: Never     Sexual activity: Not on file   Lifestyle     Physical activity     Days per week: Not on file     Minutes per session: Not on file     Stress: Not on file   Relationships     Social connections     Talks on phone: Not on file     Gets together: Not on file     Attends Roman [employer] service: Not on file     Active member of club or organization: Not on file     Attends meetings of clubs or organizations: Not on file     Relationship status: Not on file     Intimate partner violence     Fear of current or ex partner: Not on file     Emotionally abused: Not on file     Physically abused: Not on file     Forced sexual activity: Not on file   Other Topics Concern     Parent/sibling w/ CABG, MI or angioplasty before 65F 55M? Not Asked   Social History Narrative     Not on file       ROS:   CONSTITUTIONAL:  No fevers or chills  EYES:  negative for icterus  ENT:  negative for hearing loss, tinnitus and sore throat  RESPIRATORY:  negative for cough, sputum, dyspnea  CARDIOVASCULAR:  negative for chest pain Fatigue  GASTROINTESTINAL:  negative for nausea, vomiting, diarrhea or constipation  GENITOURINARY:  negative for incontinence, dysuria, bladder emptying problems  HEME:  No easy bruising  INTEGUMENT:  negative for rash and pruritus  NEURO:  Negative for headache, seizure disorder  Allergies:   Allergies   Allergen Reactions     Nsaids Other (See Comments) and Unknown     Medications:  Prescription Medications as of 6/7/2021       Rx Number Disp Refills Start End Last Dispensed Date Next Fill Date Owning Pharmacy    amLODIPine (NORVASC) 10 MG tablet   2 8/20/2019        Class: Historical    atorvastatin (LIPITOR) 10 MG tablet   1 10/3/2019        Class: Historical    cyanocobalamin (VITAMIN B-12) 1000 MCG tablet            Sig: Take 1,000 mcg by mouth    Class: Historical    Route: Oral    metoprolol succinate ER (TOPROL-XL) 50 MG 24 hr tablet   2 8/20/2019        Class: Historical    Turmeric Curcumin 500 MG CAPS            Sig: Take 1 tablet by mouth    Class: Historical    Route: Oral    vitamin D3 (CHOLECALCIFEROL) 2000 units (50 mcg) tablet    1/4/2013        Sig: Take 1 tablet by mouth    Class: Historical    Route: Oral    hydrochlorothiazide (HYDRODIURIL) 12.5 MG tablet   2 8/12/2019        Class: Historical        Exam:   Constitutional - A&O in NAD.   Eyes - no redness or discharge.  Sclera anicteric  Respiratory - no cough, no labored breathing  Musculoskeletal - range of motion normal  Skin - no discoloration, no jaundice  Neurological - no tremors.  No facial droop or dysarthria  Psychiatric - normal mood and affect  The rest of a comprehensive physical examination is deferred due to PHE (public health emergency) video visit restrictions     Diagnostics:   No results found for this or any previous visit (from the past 672  hour(s)).  UNOS cPRA   Date Value Ref Range Status   04/26/2021 66  Final

## 2021-06-07 NOTE — LETTER
6/7/2021         RE: Huber Villalba  [address] [id number]        Dear Colleague,    Thank you for referring your patient, Huber Villalba, to the [facility]PL[facility]. Please see a copy of my visit note below.    HUBER is a 80 year old who is being evaluated via a billable video visit.      How would you like to obtain your AVS? MyChart  If the video visit is dropped, the invitation should be resent by: Text to cell phone: [phone]     Will anyone else be joining your video visit? YES  Please send appt link to Son: [email or web address]  Please send link to daughter: [email or web address]        Video Start Time: 3:29 PM  Video-Visit Details    Type of service:  Video Visit    Video End Time:4:07pm    Originating Location (pt. Location): Home    Distant Location (provider location):  [facility]     Platform used for Video Visit: [facility]       Transplant Surgery Consult Note     Medical record number: [id number]  YOB: 1940,   Consult requested by Dr. Villalba for evaluation of kidney transplant candidacy.    Assessment and Recommendations:Mr. Villalba appears to be a good candidate for kidney transplantation and has a good understanding of the risks and benefits of this approach to the management of renal failure. The following issues should be addressed prior to finalizing his transplant candidacy:     Mr. Villalba has Chronic renal failure due to unknown etiology whose condition is not expected to resolve, is expected to progress, and is expected to continue to develop related comorbid conditions.  Cardiology consult for cardiac risk stratification to be ordered: Yes  CT abdomen and pelvis without contrast to be ordered for assessment of vascular targets: Yes  Dietician consult ordered: Yes  Social work consult ordered: Yes  Transplant listing labs ordered to include HLA, ABOx2, Cr, etc.  Imaging reports reviewed:  Yes  Radiology images reviewed:  yes  Recipient suitable to move forward with work up of living donors:  Yes         79 yo (legally), but 77 yo male with CKD stage 5 unknown   Beta thallesemia minor  GFR 14, patient is quite active at this stage  Exercises regularly  Has a wife and 2 children in the 40s  Would like me to do a virtual visit with him and family to explain the options to all  Surgically appears like a good candidate for LD tx  DDtx is not a good option for a variety of reasons. Fortunately he has options with LD  CT abd plain to assess vessels and Doppler as well  H/O prostatectomy thru midline scar for prostate cancer in   Cardiac cath in  with <20 percent stenosis  Has had a treadmill in 2020  Get above reports  All he may need is an echo    The majority of our visit was spent in counselling, discussing the medical and surgical risks of kidney transplantation. We discussed approximate wait time and how that is influenced by issues such as blood type and sensitization (PRA) and access to a living donor. I contrasted potential waiting time for living vs  donor kidneys from  normal (0-85%) or higher (%) kidney donor profile index (KDPI) donors and their associated outcomes. I would not recommend this individual to consider kidneys from high KDPI donors. The reason for this decision is best summarized as: not on dialysis yet. Potential surgical complications of kidney transplantation include bleeding, superficial or deep wound complications (infection, hernia, lymphocele), ureteral anastomotic failure (leak or stenosis), graft thrombosis, need for reoperation and other issues such as cardiac complications, pneumonia, deep venous thrombosis, pulmonary embolism, post transplant diabetes and death. The potential for recurrent disease or need for retransplantation was also addressed. We discussed the possible need for ureteral stent (and subsequent removal), and the utility of protocol biopsy and laboratory  studies to evaluate for rejection or recurrent disease. We discussed the risk of graft rejection, our center's average graft and patient survival rates, immunosuppression protocols, as well as the potential opportunity to participate in clinical trials.  We also discussed the average length of stay, recovery process, and posttransplant lab and monitoring protocol.  I emphasized the need for strict immunosuppression medication adherence and the potential for complications of immunosuppression such as skin cancer or lymphoma, as well as a very low but not zero risk of donor-derived disease transmission risks (infection, cancer). Mr. Villalba asked good questions and his candidacy will be reviewed at our Multidisciplinary Selection Committee. Thank you for the opportunity to participate in Mr. Villalba's care.    May Em, CNP    I have reviewed history, examined patient and discussed plan with the fellow/resident/IOANA.  I concur with the findings in this note.     Today's visit was spent in counseling patient along with family (daughter and wife) re LD tx, benefits, risks and immunosppressive protocols    Total time: 40 minutes      .  ---------------------------------------------------------------------------------------------------    HPI: Mr. Villalba has Chronic renal failure due to unknown etiology. The patient is non-diabetic.       The patient is not on dialysis.    Has potential kidney donors:  Doesn't know .  Interested in participation in paired exchange if a donor is willing: Doesn't know     The patient has the following pertinent history:       No    Yes  Dialysis:    [x]      [] via:       Blood Transfusion                  [x]      []  Number of units:   Most recently:  Pregnancy:    [x]      [] Number:       Previous Transplant:  [x]      [] Details:    Cancer    []      [x] Comment:  Prostate cancer   Kidney stones  []      [] Comment:      Recurrent infections  [x]      []  Type:                  Bladder  dysfunction  [x]      [] Cause:    Claudication   [x]      [] Distance:    Previous Amputation  [x]      [] Cause:     Chronic anticoagulation [x]      [] Indication:   Moravian  [x]      []      Past Medical History:   Diagnosis Date     Beta thalassemia (H)      Chronic kidney disease, stage V (H)      Hypertension      Kidney stone      Prostate cancer (H)      Past Surgical History:   Procedure Laterality Date     ARTHROPLASTY SHOULDER       AS TOTAL KNEE ARTHROPLASTY Left      LAPAROSCOPIC PROSTATECTOMY  03/2005     LITHOTRIPSY       RELEASE CARPAL TUNNEL       Family History   Problem Relation Age of Onset     Kidney Disease Brother      Kidney Disease Brother      Social History     Socioeconomic History     Marital status:      Spouse name: Not on file     Number of children: Not on file     Years of education: Not on file     Highest education level: Not on file   Occupational History     Not on file   Social Needs     Financial resource strain: Not on file     Food insecurity     Worry: Not on file     Inability: Not on file     Transportation needs     Medical: Not on file     Non-medical: Not on file   Tobacco Use     Smoking status: Never Smoker     Smokeless tobacco: Never Used   Substance and Sexual Activity     Alcohol use: Not Currently     Frequency: Never     Binge frequency: Never     Drug use: Never     Sexual activity: Not on file   Lifestyle     Physical activity     Days per week: Not on file     Minutes per session: Not on file     Stress: Not on file   Relationships     Social connections     Talks on phone: Not on file     Gets together: Not on file     Attends Worship service: Not on file     Active member of club or organization: Not on file     Attends meetings of clubs or organizations: Not on file     Relationship status: Not on file     Intimate partner violence     Fear of current or ex partner: Not on file     Emotionally abused: Not on file     Physically abused:  Not on file     Forced sexual activity: Not on file   Other Topics Concern     Parent/sibling w/ CABG, MI or angioplasty before 65F 55M? Not Asked   Social History Narrative     Not on file       ROS:   CONSTITUTIONAL:  No fevers or chills  EYES: negative for icterus  ENT:  negative for hearing loss, tinnitus and sore throat  RESPIRATORY:  negative for cough, sputum, dyspnea  CARDIOVASCULAR:  negative for chest pain Fatigue  GASTROINTESTINAL:  negative for nausea, vomiting, diarrhea or constipation  GENITOURINARY:  negative for incontinence, dysuria, bladder emptying problems  HEME:  No easy bruising  INTEGUMENT:  negative for rash and pruritus  NEURO:  Negative for headache, seizure disorder  Allergies:   Allergies   Allergen Reactions     Nsaids Other (See Comments) and Unknown     Medications:  Prescription Medications as of 6/7/2021       Rx Number Disp Refills Start End Last Dispensed Date Next Fill Date Owning Pharmacy    amLODIPine (NORVASC) 10 MG tablet   2 8/20/2019        Class: Historical    atorvastatin (LIPITOR) 10 MG tablet   1 10/3/2019        Class: Historical    cyanocobalamin (VITAMIN B-12) 1000 MCG tablet            Sig: Take 1,000 mcg by mouth    Class: Historical    Route: Oral    metoprolol succinate ER (TOPROL-XL) 50 MG 24 hr tablet   2 8/20/2019        Class: Historical    Turmeric Curcumin 500 MG CAPS            Sig: Take 1 tablet by mouth    Class: Historical    Route: Oral    vitamin D3 (CHOLECALCIFEROL) 2000 units (50 mcg) tablet    1/4/2013        Sig: Take 1 tablet by mouth    Class: Historical    Route: Oral    hydrochlorothiazide (HYDRODIURIL) 12.5 MG tablet   2 8/12/2019        Class: Historical        Exam:   Constitutional - A&O in NAD.   Eyes - no redness or discharge.  Sclera anicteric  Respiratory - no cough, no labored breathing  Musculoskeletal - range of motion normal  Skin - no discoloration, no jaundice  Neurological - no tremors.  No facial droop or dysarthria  Psychiatric  - normal mood and affect  The rest of a comprehensive physical examination is deferred due to PHE (public health emergency) video visit restrictions     Diagnostics:   No results found for this or any previous visit (from the past 672 hour(s)).  UNOS cPRA   Date Value Ref Range Status   04/26/2021 66  Final           Again, thank you for allowing me to participate in the care of your patient.        Sincerely,        Lucie Ramos MD

## 2021-06-16 ENCOUNTER — TELEPHONE (OUTPATIENT)
Dept: TRANSPLANT | Facility: CLINIC | Age: 81
End: 2021-06-16

## 2021-06-16 NOTE — TELEPHONE ENCOUNTER
Left message for patient to schedule [facility] Ultrasound and Abdomen/Pelvis CT.  Asked patient to call back to schedule.

## 2021-09-04 ENCOUNTER — HEALTH MAINTENANCE LETTER (OUTPATIENT)
Age: 81
End: 2021-09-04

## 2021-09-20 ENCOUNTER — TELEPHONE (OUTPATIENT)
Dept: TRANSPLANT | Facility: CLINIC | Age: 81
End: 2021-09-20

## 2021-09-21 NOTE — TELEPHONE ENCOUNTER
Returned call to Huber. He was wondering what his blood type was. He has not found a live donor at this time but is still hopeful. He is aware he could receive from another ABO O but there is the option of PEP if he finds a donor not compatible.

## 2022-02-19 ENCOUNTER — HEALTH MAINTENANCE LETTER (OUTPATIENT)
Age: 82
End: 2022-02-19

## 2022-10-22 ENCOUNTER — HEALTH MAINTENANCE LETTER (OUTPATIENT)
Age: 82
End: 2022-10-22

## 2023-04-01 ENCOUNTER — HEALTH MAINTENANCE LETTER (OUTPATIENT)
Age: 83
End: 2023-04-01

## 2023-07-20 ENCOUNTER — TELEPHONE (OUTPATIENT)
Dept: TRANSPLANT | Facility: CLINIC | Age: 83
End: 2023-07-20
Payer: COMMERCIAL

## 2023-07-20 NOTE — TELEPHONE ENCOUNTER
Called Huber to ask him about his progress in finding a living donor.  He has not found a live donor yet.  He has started dialysis.

## 2023-07-26 ENCOUNTER — COMMITTEE REVIEW (OUTPATIENT)
Dept: TRANSPLANT | Facility: CLINIC | Age: 83
End: 2023-07-26
Payer: COMMERCIAL

## 2023-07-26 NOTE — COMMITTEE REVIEW
Abdominal Committee Review Note     Evaluation Date: 4/26/2021  Committee Review Date: 7/26/2023    Organ being evaluated for: Kidney    Transplant Phase: Evaluation  Transplant Status: Approved for Live Donor Only    Transplant Coordinator: Jenny Li  Transplant Surgeon: Dr. Ramos       Referring Physician: Referred Self    Primary Diagnosis: Other, Specify - unknown  Secondary Diagnosis: ESKD    Committee Review Members:  Nephrology Yoandy Clay MD, Olga Dickerson PA-C, Onelia Carvalho MD   Nutrition Vicenta Michelle, BRENDEN   Pharmacist Rentia Xiao, [facility]   Pharmacy Melo Lorenzana, [facility]    - Clinical Cecy Angela Fields, [facility], Fidelia Rangel, [facility]   Transplant CHING SARAVIA, RN, Marisabel Simons, TRAVIS, Adia Cruz, TRAVIS, May Em, SHAHRAM, May Verdin, TRAVIS, Hernan Perez, TRAVIS, Jenny Preciado, TRAVIS, Sima Godinez, RN   Transplant Surgery Suresh Aaron MD       Transplant Eligibility: Irreversible chronic kidney disease treated w/dialysis or expected need for dialysis    Committee Review Decision: Declined    Relative Contraindications: Other, no live donors    Absolute Contraindications: no living donors and approved for living donor only    Committee Chair Suresh Aaron MD verbally attested to the committee's decision.    Committee Discussion Details: Reviewed patient's medical status and limited evaluation results to date.  Discussed that he was approved for living donor only in 2021 and has been on dialysis since 2022.  For these reasons, he cannot be listed inactive to gain waiting time.  Since he has not yet found a live donor, decision made to end his evaluation here since he will have waiting time dating back to his dialysis initiation. He can be re referred once he has found a living donor. Patient will be called and letter will be sent.

## 2023-07-28 ENCOUNTER — TELEPHONE (OUTPATIENT)
Dept: TRANSPLANT | Facility: CLINIC | Age: 83
End: 2023-07-28
Payer: COMMERCIAL

## 2023-07-28 NOTE — TELEPHONE ENCOUNTER
Called Huber to let him know the outcome of the Selection Committee. Let him know that at this time since he does not have a potential living donor and no leads, we would end his evaluation and that he can be re -referred in the future if he finds a donor and wants to move forward.  Let him know to call me on my direct line or to call the transplant center directly and ask to speak with an intake person to start his referral should he find a donor. He expressed good understanding. Evaluation will be closed and letter will be sent to patient and care team.

## 2023-07-28 NOTE — LETTER
July 28, 2023    Huber Villalba  [address] [facility] [id number]      Dear Mr. Villalba,   The purpose of this letter is to let you know that on 07/26/2023 the [facility] Multi-Disciplinary Selection Team reviewed the results of your transplant evaluation.  Based on the results of your evaluation and the selection criteria used by our program, the decision was made to not list you on the kidney transplant list.  This is because you are approved for living donor only and have not yet found a live donor.  You have not seen a provider from our program for two years and your evaluation is not yet complete.  We will close your current evaluation at this time but pleas know that you can always be re referred if you should find a living donor and we would be happy for you to come in and see our providers again. We wish you all the best!   Important things you should know:  If you would like to discuss the decision, or if your medical status changes you may schedule a return visits with your doctor by calling [phone] and asking to speak to your transplant coordinator.  We recommend that you continue to follow up with your primary care doctor in order to manage your health concerns.  We want you to know that our program has physician and surgeon coverage 24 hours a day, 365 days a year. In addition, our transplant surgeons and physicians will not be on call for two or more transplant programs more than 30 miles apart unless the circumstances have been reviewed and approved by the [employer] ([employer]) Membership and Professional Standards Committee (MPSC). Finally, our primary physician and primary surgeons are not designated as the primary surgeon or primary physician at more than 1 transplant hospital. If this coverage changes or there are substantial program changes, you will be notified in writing by letter.   Attached is a letter from [employer] that  describes the services and information offered to patients by [employer] and the [employer]ment and Transplantation Network (OPTN).    Thank you for allowing us to participate in your care.  We wish you well.  Sincerely,  Jenny HUERTA RN   Pre-Kidney Transplant Coordinator  Direct line: [phone]       Solid Organ Transplant  [facility]    Enclosures: [employer] Letter  cc: Care Team            The [facility]plantation Network  Toll-free patient services line:     Your resource for organ transplant information    If you have a question regarding your own medical care, you always should call your transplant hospital first. However, for general organ transplant-related information, you can call the Organ Procurement and Transplantation Network (OPTN) toll-free patient services line at [phone]. Anyone, including potential transplant candidates, candidates, recipients, family members, friends, living donors, and donor family members, can call this number to:      Talk about organ donation, living donation, the transplant process, the donation process, and transplant policies.  Get a free patient information kit with helpful booklets, waiting list and transplant information, and a list of all transplant hospitals.  Ask questions about the OPTN website (https://optn.transplant.hrsa.gov/), the [employer] s (UNOS) website (https://unos.org/), or the [employer] website for living donors and transplant recipients. (https://www.transplantliving.org/).  Learn how the OPTN can help you.  Talk about any concerns that you may have with a transplant hospital.    The [facility] s transplant system, the OPTN, is managed under federal contract by the [employer] (UNOS), which is a non-profit charitable organization. The OPTN helps create and define organ sharing policies that make the best use of donated organs. This process  continuously evaluating new advances and discoveries so policies can be adapted to best serve patients waiting for transplants. To do so, the OPTN works closely with transplant professionals, transplant patients, transplant candidates, donor families, living donors, and the public. All transplant programs and organ procurement organizations throughout the country are OPTN members and are obligated to follow the policies the OPTN creates for allocating organs.    The OPTN also is responsible for:    Providing educational material for patients, the public, and professionals.  Raising awareness of the need for donated organs and tissue.  Coordinating organ procurement, matching, and placement.  Collecting information about every organ transplant and donation that occurs in the [place].    Remember, you should contact your transplant hospital directly if you have questions or concerns about your own medical care including medical records, work-up progress, and test results.    We are not your transplant hospital, and our staff will not be able to answer questions about your case, so please keep your transplant hospital s phone number handy.    However, while you research your transplant needs and learn as much as you can about transplantation and donation, we welcome your call to our toll-free patient services line at [phone].          Updated 4/1/2019    CC: Care team

## 2024-06-02 ENCOUNTER — HEALTH MAINTENANCE LETTER (OUTPATIENT)
Age: 84
End: 2024-06-02

## 2024-06-06 ENCOUNTER — HOSPITAL ENCOUNTER (OUTPATIENT)
Dept: CT IMAGING | Facility: CLINIC | Age: 84
Discharge: HOME | End: 2024-06-06
Attending: COLON & RECTAL SURGERY | Admitting: COLON & RECTAL SURGERY
Payer: COMMERCIAL

## 2024-06-06 DIAGNOSIS — K57.92 DIVERTICULITIS: ICD-10-CM

## 2024-06-06 LAB — RADIOLOGIST FLAGS: ABNORMAL

## 2024-06-06 PROCEDURE — 250N000011 HC RX IP 250 OP 636: Performed by: COLON & RECTAL SURGERY

## 2024-06-06 PROCEDURE — 74177 CT ABD & PELVIS W/CONTRAST: CPT

## 2024-06-06 RX ORDER — IOPAMIDOL 755 MG/ML
100 INJECTION, SOLUTION INTRAVASCULAR ONCE
Status: COMPLETED | OUTPATIENT
Start: 2024-06-06 | End: 2024-06-06

## 2024-06-06 RX ADMIN — IOPAMIDOL 100 ML: 755 INJECTION, SOLUTION INTRAVENOUS at 10:05

## 2024-07-02 ENCOUNTER — HOSPITAL ENCOUNTER (INPATIENT)
Facility: HOSPITAL | Age: 84
Setting detail: SURGERY ADMIT
End: 2024-07-02
Attending: COLON & RECTAL SURGERY | Admitting: COLON & RECTAL SURGERY
Payer: COMMERCIAL

## 2024-09-28 ENCOUNTER — HOSPITAL ENCOUNTER (OUTPATIENT)
Dept: CT IMAGING | Facility: HOSPITAL | Age: 84
Discharge: HOME | End: 2024-09-28
Payer: COMMERCIAL

## 2024-09-28 DIAGNOSIS — K57.92 DIVERTICULITIS: ICD-10-CM

## 2024-09-28 LAB
CREAT BLD-MCNC: 5.3 MG/DL (ref 0.7–1.3)
EGFRCR SERPLBLD CKD-EPI 2021: 10 ML/MIN/1.73M2

## 2024-09-28 PROCEDURE — 74177 CT ABD & PELVIS W/CONTRAST: CPT

## 2024-09-28 PROCEDURE — 250N000011 HC RX IP 250 OP 636

## 2024-09-28 PROCEDURE — 82565 ASSAY OF CREATININE: CPT

## 2024-09-28 RX ORDER — IOPAMIDOL 755 MG/ML
67 INJECTION, SOLUTION INTRAVASCULAR ONCE
Status: COMPLETED | OUTPATIENT
Start: 2024-09-28 | End: 2024-09-28

## 2024-09-28 RX ADMIN — IOPAMIDOL 67 ML: 755 INJECTION, SOLUTION INTRAVENOUS at 15:32

## 2024-11-15 ENCOUNTER — LAB REQUISITION (OUTPATIENT)
Dept: LAB | Facility: CLINIC | Age: 84
End: 2024-11-15
Payer: COMMERCIAL

## 2024-11-15 DIAGNOSIS — K65.1 PERITONEAL ABSCESS (H): ICD-10-CM

## 2024-11-15 LAB
ALP SERPL-CCNC: 66 U/L (ref 40–150)
ALT SERPL W P-5'-P-CCNC: 15 U/L (ref 0–70)
AST SERPL W P-5'-P-CCNC: 29 U/L (ref 0–45)
BASOPHILS # BLD AUTO: 0 10E3/UL (ref 0–0.2)
BASOPHILS NFR BLD AUTO: 0 %
BILIRUB SERPL-MCNC: 0.6 MG/DL
BUN SERPL-MCNC: 20.3 MG/DL (ref 8–23)
CREAT SERPL-MCNC: 2.73 MG/DL (ref 0.67–1.17)
EGFRCR SERPLBLD CKD-EPI 2021: 22 ML/MIN/1.73M2
EOSINOPHIL # BLD AUTO: 0.1 10E3/UL (ref 0–0.7)
EOSINOPHIL NFR BLD AUTO: 1 %
ERYTHROCYTE [DISTWIDTH] IN BLOOD BY AUTOMATED COUNT: 21.9 % (ref 10–15)
HCT VFR BLD AUTO: 37.3 % (ref 40–53)
HGB BLD-MCNC: 11.7 G/DL (ref 13.3–17.7)
HOLD SPECIMEN: NORMAL
HOLD SPECIMEN: NORMAL
IMM GRANULOCYTES # BLD: 0.1 10E3/UL
IMM GRANULOCYTES NFR BLD: 1 %
LYMPHOCYTES # BLD AUTO: 1.1 10E3/UL (ref 0.8–5.3)
LYMPHOCYTES NFR BLD AUTO: 15 %
MCH RBC QN AUTO: 23.9 PG (ref 26.5–33)
MCHC RBC AUTO-ENTMCNC: 31.4 G/DL (ref 31.5–36.5)
MCV RBC AUTO: 76 FL (ref 78–100)
MONOCYTES # BLD AUTO: 1.2 10E3/UL (ref 0–1.3)
MONOCYTES NFR BLD AUTO: 15 %
NEUTROPHILS # BLD AUTO: 5.4 10E3/UL (ref 1.6–8.3)
NEUTROPHILS NFR BLD AUTO: 69 %
NRBC # BLD AUTO: 0 10E3/UL
NRBC BLD AUTO-RTO: 0 /100
PLATELET # BLD AUTO: 239 10E3/UL (ref 150–450)
RBC # BLD AUTO: 4.9 10E6/UL (ref 4.4–5.9)
WBC # BLD AUTO: 7.8 10E3/UL (ref 4–11)

## 2024-11-15 PROCEDURE — 84460 ALANINE AMINO (ALT) (SGPT): CPT | Mod: ORL | Performed by: INTERNAL MEDICINE

## 2024-11-15 PROCEDURE — 84520 ASSAY OF UREA NITROGEN: CPT | Mod: ORL | Performed by: INTERNAL MEDICINE

## 2024-11-15 PROCEDURE — 84450 TRANSFERASE (AST) (SGOT): CPT | Mod: ORL | Performed by: INTERNAL MEDICINE

## 2024-11-15 PROCEDURE — 84075 ASSAY ALKALINE PHOSPHATASE: CPT | Mod: ORL | Performed by: INTERNAL MEDICINE

## 2024-11-15 PROCEDURE — 82565 ASSAY OF CREATININE: CPT | Mod: ORL | Performed by: INTERNAL MEDICINE

## 2024-11-15 PROCEDURE — 85025 COMPLETE CBC W/AUTO DIFF WBC: CPT | Mod: ORL | Performed by: INTERNAL MEDICINE

## 2024-11-15 PROCEDURE — 82247 BILIRUBIN TOTAL: CPT | Mod: ORL | Performed by: INTERNAL MEDICINE

## 2024-11-20 ENCOUNTER — LAB REQUISITION (OUTPATIENT)
Dept: LAB | Facility: HOSPITAL | Age: 84
End: 2024-11-20
Payer: COMMERCIAL

## 2024-11-20 DIAGNOSIS — K65.1 PERITONEAL ABSCESS (H): ICD-10-CM

## 2024-11-20 LAB
ALT SERPL W P-5'-P-CCNC: 18 U/L (ref 0–70)
AST SERPL W P-5'-P-CCNC: 34 U/L (ref 0–45)
BASOPHILS # BLD AUTO: 0 10E3/UL (ref 0–0.2)
BASOPHILS NFR BLD AUTO: 0 %
BILIRUB SERPL-MCNC: 0.4 MG/DL
BUN SERPL-MCNC: 53.5 MG/DL (ref 8–23)
CREAT SERPL-MCNC: 6.05 MG/DL (ref 0.67–1.17)
EGFRCR SERPLBLD CKD-EPI 2021: 9 ML/MIN/1.73M2
EOSINOPHIL # BLD AUTO: 0.1 10E3/UL (ref 0–0.7)
EOSINOPHIL NFR BLD AUTO: 1 %
ERYTHROCYTE [DISTWIDTH] IN BLOOD BY AUTOMATED COUNT: 22.2 % (ref 10–15)
HCT VFR BLD AUTO: 36.7 % (ref 40–53)
HGB BLD-MCNC: 11 G/DL (ref 13.3–17.7)
IMM GRANULOCYTES # BLD: 0.1 10E3/UL
IMM GRANULOCYTES NFR BLD: 1 %
LYMPHOCYTES # BLD AUTO: 1.3 10E3/UL (ref 0.8–5.3)
LYMPHOCYTES NFR BLD AUTO: 13 %
MCH RBC QN AUTO: 23.2 PG (ref 26.5–33)
MCHC RBC AUTO-ENTMCNC: 30 G/DL (ref 31.5–36.5)
MCV RBC AUTO: 77 FL (ref 78–100)
MONOCYTES # BLD AUTO: 1.3 10E3/UL (ref 0–1.3)
MONOCYTES NFR BLD AUTO: 13 %
NEUTROPHILS # BLD AUTO: 7.3 10E3/UL (ref 1.6–8.3)
NEUTROPHILS NFR BLD AUTO: 72 %
NRBC # BLD AUTO: 0.1 10E3/UL
NRBC BLD AUTO-RTO: 1 /100
PHOSPHATE SERPL-MCNC: 4.5 MG/DL (ref 2.5–4.5)
PLATELET # BLD AUTO: 243 10E3/UL (ref 150–450)
RBC # BLD AUTO: 4.74 10E6/UL (ref 4.4–5.9)
WBC # BLD AUTO: 10.1 10E3/UL (ref 4–11)

## 2024-11-20 PROCEDURE — 84100 ASSAY OF PHOSPHORUS: CPT | Mod: ORL

## 2024-11-20 PROCEDURE — 84450 TRANSFERASE (AST) (SGOT): CPT | Mod: ORL

## 2024-11-20 PROCEDURE — 85025 COMPLETE CBC W/AUTO DIFF WBC: CPT | Mod: ORL

## 2024-11-20 PROCEDURE — 84460 ALANINE AMINO (ALT) (SGPT): CPT | Mod: ORL

## 2024-11-20 PROCEDURE — 82565 ASSAY OF CREATININE: CPT | Mod: ORL

## 2024-11-20 PROCEDURE — 84520 ASSAY OF UREA NITROGEN: CPT | Mod: ORL

## 2024-11-20 PROCEDURE — 82247 BILIRUBIN TOTAL: CPT | Mod: ORL

## 2024-11-27 ENCOUNTER — LAB REQUISITION (OUTPATIENT)
Dept: LAB | Facility: HOSPITAL | Age: 84
End: 2024-11-27
Payer: COMMERCIAL

## 2024-11-27 DIAGNOSIS — K65.1 PERITONEAL ABSCESS (H): ICD-10-CM

## 2024-11-27 LAB
ALT SERPL W P-5'-P-CCNC: 17 U/L (ref 0–70)
AST SERPL W P-5'-P-CCNC: 29 U/L (ref 0–45)
BASOPHILS # BLD AUTO: 0 10E3/UL (ref 0–0.2)
BASOPHILS NFR BLD AUTO: 0 %
BILIRUB SERPL-MCNC: 0.5 MG/DL
BUN SERPL-MCNC: 58.5 MG/DL (ref 8–23)
CREAT SERPL-MCNC: 5.87 MG/DL (ref 0.67–1.17)
EGFRCR SERPLBLD CKD-EPI 2021: 9 ML/MIN/1.73M2
EOSINOPHIL # BLD AUTO: 0.2 10E3/UL (ref 0–0.7)
EOSINOPHIL NFR BLD AUTO: 2 %
ERYTHROCYTE [DISTWIDTH] IN BLOOD BY AUTOMATED COUNT: 21.9 % (ref 10–15)
HCT VFR BLD AUTO: 36.8 % (ref 40–53)
HGB BLD-MCNC: 11.4 G/DL (ref 13.3–17.7)
IMM GRANULOCYTES # BLD: 0.1 10E3/UL
IMM GRANULOCYTES NFR BLD: 1 %
LYMPHOCYTES # BLD AUTO: 1.3 10E3/UL (ref 0.8–5.3)
LYMPHOCYTES NFR BLD AUTO: 16 %
MCH RBC QN AUTO: 23.8 PG (ref 26.5–33)
MCHC RBC AUTO-ENTMCNC: 31 G/DL (ref 31.5–36.5)
MCV RBC AUTO: 77 FL (ref 78–100)
MONOCYTES # BLD AUTO: 1 10E3/UL (ref 0–1.3)
MONOCYTES NFR BLD AUTO: 13 %
NEUTROPHILS # BLD AUTO: 5.2 10E3/UL (ref 1.6–8.3)
NEUTROPHILS NFR BLD AUTO: 67 %
NRBC # BLD AUTO: 0 10E3/UL
NRBC BLD AUTO-RTO: 0 /100
PLATELET # BLD AUTO: 137 10E3/UL (ref 150–450)
RBC # BLD AUTO: 4.8 10E6/UL (ref 4.4–5.9)
WBC # BLD AUTO: 7.8 10E3/UL (ref 4–11)

## 2024-11-27 PROCEDURE — 84450 TRANSFERASE (AST) (SGOT): CPT | Mod: ORL

## 2024-11-27 PROCEDURE — 85025 COMPLETE CBC W/AUTO DIFF WBC: CPT | Mod: ORL

## 2024-11-27 PROCEDURE — 82565 ASSAY OF CREATININE: CPT | Mod: ORL

## 2024-11-27 PROCEDURE — 82247 BILIRUBIN TOTAL: CPT | Mod: ORL

## 2024-11-27 PROCEDURE — 84460 ALANINE AMINO (ALT) (SGPT): CPT | Mod: ORL

## 2024-11-27 PROCEDURE — 84520 ASSAY OF UREA NITROGEN: CPT | Mod: ORL

## 2024-12-04 ENCOUNTER — LAB REQUISITION (OUTPATIENT)
Dept: LAB | Facility: HOSPITAL | Age: 84
End: 2024-12-04
Payer: COMMERCIAL

## 2024-12-04 DIAGNOSIS — K65.1 PERITONEAL ABSCESS (H): ICD-10-CM

## 2024-12-04 LAB
ALT SERPL W P-5'-P-CCNC: 16 U/L (ref 0–70)
AST SERPL W P-5'-P-CCNC: 31 U/L (ref 0–45)
BILIRUB SERPL-MCNC: 0.4 MG/DL
BUN SERPL-MCNC: 69.9 MG/DL (ref 8–23)
CREAT SERPL-MCNC: 5.77 MG/DL (ref 0.67–1.17)
EGFRCR SERPLBLD CKD-EPI 2021: 9 ML/MIN/1.73M2
HOLD SPECIMEN: NORMAL
HOLD SPECIMEN: NORMAL

## 2024-12-04 PROCEDURE — 84450 TRANSFERASE (AST) (SGOT): CPT | Mod: ORL

## 2024-12-04 PROCEDURE — 84460 ALANINE AMINO (ALT) (SGPT): CPT | Mod: ORL

## 2024-12-04 PROCEDURE — 82565 ASSAY OF CREATININE: CPT | Mod: ORL

## 2024-12-04 PROCEDURE — 82247 BILIRUBIN TOTAL: CPT | Mod: ORL

## 2024-12-04 PROCEDURE — 84520 ASSAY OF UREA NITROGEN: CPT | Mod: ORL

## 2024-12-11 ENCOUNTER — LAB REQUISITION (OUTPATIENT)
Dept: LAB | Facility: HOSPITAL | Age: 84
End: 2024-12-11
Payer: COMMERCIAL

## 2024-12-11 DIAGNOSIS — K57.20 DIVERTICULITIS OF LARGE INTESTINE WITH PERFORATION AND ABSCESS WITHOUT BLEEDING: ICD-10-CM

## 2024-12-11 DIAGNOSIS — N18.6 END STAGE RENAL DISEASE (H): ICD-10-CM

## 2024-12-11 LAB
ALP SERPL-CCNC: 77 U/L (ref 40–150)
ALT SERPL W P-5'-P-CCNC: 17 U/L (ref 0–70)
AST SERPL W P-5'-P-CCNC: 28 U/L (ref 0–45)
BASOPHILS # BLD AUTO: 0 10E3/UL (ref 0–0.2)
BASOPHILS NFR BLD AUTO: 0 %
BILIRUB SERPL-MCNC: 0.4 MG/DL
BUN SERPL-MCNC: 83.9 MG/DL (ref 8–23)
CREAT SERPL-MCNC: 6.9 MG/DL (ref 0.67–1.17)
DACRYOCYTES BLD QL SMEAR: SLIGHT
EGFRCR SERPLBLD CKD-EPI 2021: 7 ML/MIN/1.73M2
ELLIPTOCYTES BLD QL SMEAR: SLIGHT
EOSINOPHIL # BLD AUTO: 0.2 10E3/UL (ref 0–0.7)
EOSINOPHIL NFR BLD AUTO: 2 %
ERYTHROCYTE [DISTWIDTH] IN BLOOD BY AUTOMATED COUNT: 18.6 % (ref 10–15)
HCT VFR BLD AUTO: 34.1 % (ref 40–53)
HGB BLD-MCNC: 10.6 G/DL (ref 13.3–17.7)
IMM GRANULOCYTES # BLD: 0 10E3/UL
IMM GRANULOCYTES NFR BLD: 0 %
LYMPHOCYTES # BLD AUTO: 1.3 10E3/UL (ref 0.8–5.3)
LYMPHOCYTES NFR BLD AUTO: 17 %
MCH RBC QN AUTO: 23.1 PG (ref 26.5–33)
MCHC RBC AUTO-ENTMCNC: 31.1 G/DL (ref 31.5–36.5)
MCV RBC AUTO: 75 FL (ref 78–100)
MONOCYTES # BLD AUTO: 1.6 10E3/UL (ref 0–1.3)
MONOCYTES NFR BLD AUTO: 20 %
NEUTROPHILS # BLD AUTO: 4.6 10E3/UL (ref 1.6–8.3)
NEUTROPHILS NFR BLD AUTO: 60 %
NRBC # BLD AUTO: 0 10E3/UL
NRBC BLD AUTO-RTO: 0 /100
PLAT MORPH BLD: ABNORMAL
PLATELET # BLD AUTO: 137 10E3/UL (ref 150–450)
POLYCHROMASIA BLD QL SMEAR: SLIGHT
RBC # BLD AUTO: 4.58 10E6/UL (ref 4.4–5.9)
RBC MORPH BLD: ABNORMAL
TARGETS BLD QL SMEAR: SLIGHT
WBC # BLD AUTO: 7.6 10E3/UL (ref 4–11)

## 2024-12-11 PROCEDURE — 84460 ALANINE AMINO (ALT) (SGPT): CPT | Performed by: INTERNAL MEDICINE

## 2024-12-11 PROCEDURE — 84450 TRANSFERASE (AST) (SGOT): CPT | Performed by: INTERNAL MEDICINE

## 2024-12-11 PROCEDURE — 84520 ASSAY OF UREA NITROGEN: CPT | Performed by: INTERNAL MEDICINE

## 2024-12-11 PROCEDURE — 84075 ASSAY ALKALINE PHOSPHATASE: CPT | Performed by: INTERNAL MEDICINE

## 2024-12-11 PROCEDURE — 82565 ASSAY OF CREATININE: CPT | Performed by: INTERNAL MEDICINE

## 2024-12-11 PROCEDURE — 85025 COMPLETE CBC W/AUTO DIFF WBC: CPT | Performed by: INTERNAL MEDICINE

## 2024-12-11 PROCEDURE — 82247 BILIRUBIN TOTAL: CPT | Performed by: INTERNAL MEDICINE

## 2025-06-14 ENCOUNTER — HEALTH MAINTENANCE LETTER (OUTPATIENT)
Age: 85
End: 2025-06-14